# Patient Record
Sex: FEMALE | Race: WHITE | NOT HISPANIC OR LATINO
[De-identification: names, ages, dates, MRNs, and addresses within clinical notes are randomized per-mention and may not be internally consistent; named-entity substitution may affect disease eponyms.]

---

## 2022-11-02 ENCOUNTER — NON-APPOINTMENT (OUTPATIENT)
Age: 22
End: 2022-11-02

## 2023-01-16 ENCOUNTER — INPATIENT (INPATIENT)
Facility: HOSPITAL | Age: 23
LOS: 2 days | Discharge: HOME | DRG: 440 | End: 2023-01-19
Attending: INTERNAL MEDICINE | Admitting: FAMILY MEDICINE
Payer: COMMERCIAL

## 2023-01-16 VITALS — WEIGHT: 113.1 LBS | HEIGHT: 64 IN

## 2023-01-16 DIAGNOSIS — K85.90 ACUTE PANCREATITIS WITHOUT NECROSIS OR INFECTION, UNSPECIFIED: ICD-10-CM

## 2023-01-16 DIAGNOSIS — Z98.890 OTHER SPECIFIED POSTPROCEDURAL STATES: Chronic | ICD-10-CM

## 2023-01-16 LAB
ADD ON TEST-SPECIMEN IN LAB: SIGNIFICANT CHANGE UP
ALBUMIN SERPL ELPH-MCNC: 4.2 G/DL — SIGNIFICANT CHANGE UP (ref 3.3–5)
ALP SERPL-CCNC: 69 U/L — SIGNIFICANT CHANGE UP (ref 40–120)
ALT FLD-CCNC: 28 U/L — SIGNIFICANT CHANGE UP (ref 12–78)
ANION GAP SERPL CALC-SCNC: 11 MMOL/L — SIGNIFICANT CHANGE UP (ref 5–17)
APPEARANCE UR: CLEAR — SIGNIFICANT CHANGE UP
AST SERPL-CCNC: 21 U/L — SIGNIFICANT CHANGE UP (ref 15–37)
BASOPHILS # BLD AUTO: 0.05 K/UL — SIGNIFICANT CHANGE UP (ref 0–0.2)
BASOPHILS NFR BLD AUTO: 0.5 % — SIGNIFICANT CHANGE UP (ref 0–2)
BILIRUB SERPL-MCNC: 0.6 MG/DL — SIGNIFICANT CHANGE UP (ref 0.2–1.2)
BILIRUB UR-MCNC: NEGATIVE — SIGNIFICANT CHANGE UP
BUN SERPL-MCNC: 8 MG/DL — SIGNIFICANT CHANGE UP (ref 7–23)
CALCIUM SERPL-MCNC: 9.8 MG/DL — SIGNIFICANT CHANGE UP (ref 8.5–10.1)
CHLORIDE SERPL-SCNC: 99 MMOL/L — SIGNIFICANT CHANGE UP (ref 96–108)
CO2 SERPL-SCNC: 28 MMOL/L — SIGNIFICANT CHANGE UP (ref 22–31)
COLOR SPEC: YELLOW — SIGNIFICANT CHANGE UP
CREAT SERPL-MCNC: 0.9 MG/DL — SIGNIFICANT CHANGE UP (ref 0.5–1.3)
DIFF PNL FLD: NEGATIVE — SIGNIFICANT CHANGE UP
EGFR: 92 ML/MIN/1.73M2 — SIGNIFICANT CHANGE UP
EOSINOPHIL # BLD AUTO: 0.1 K/UL — SIGNIFICANT CHANGE UP (ref 0–0.5)
EOSINOPHIL NFR BLD AUTO: 1 % — SIGNIFICANT CHANGE UP (ref 0–6)
FLUAV AG NPH QL: SIGNIFICANT CHANGE UP
FLUBV AG NPH QL: SIGNIFICANT CHANGE UP
GLUCOSE SERPL-MCNC: 94 MG/DL — SIGNIFICANT CHANGE UP (ref 70–99)
GLUCOSE UR QL: NEGATIVE — SIGNIFICANT CHANGE UP
HCT VFR BLD CALC: 47.2 % — HIGH (ref 34.5–45)
HGB BLD-MCNC: 16.3 G/DL — HIGH (ref 11.5–15.5)
IMM GRANULOCYTES NFR BLD AUTO: 0.3 % — SIGNIFICANT CHANGE UP (ref 0–0.9)
KETONES UR-MCNC: NEGATIVE — SIGNIFICANT CHANGE UP
LEUKOCYTE ESTERASE UR-ACNC: NEGATIVE — SIGNIFICANT CHANGE UP
LIDOCAIN IGE QN: 2113 U/L — HIGH (ref 73–393)
LYMPHOCYTES # BLD AUTO: 2.99 K/UL — SIGNIFICANT CHANGE UP (ref 1–3.3)
LYMPHOCYTES # BLD AUTO: 30.4 % — SIGNIFICANT CHANGE UP (ref 13–44)
MCHC RBC-ENTMCNC: 29.4 PG — SIGNIFICANT CHANGE UP (ref 27–34)
MCHC RBC-ENTMCNC: 34.5 GM/DL — SIGNIFICANT CHANGE UP (ref 32–36)
MCV RBC AUTO: 85 FL — SIGNIFICANT CHANGE UP (ref 80–100)
MONOCYTES # BLD AUTO: 1.15 K/UL — HIGH (ref 0–0.9)
MONOCYTES NFR BLD AUTO: 11.7 % — SIGNIFICANT CHANGE UP (ref 2–14)
NEUTROPHILS # BLD AUTO: 5.5 K/UL — SIGNIFICANT CHANGE UP (ref 1.8–7.4)
NEUTROPHILS NFR BLD AUTO: 56.1 % — SIGNIFICANT CHANGE UP (ref 43–77)
NITRITE UR-MCNC: NEGATIVE — SIGNIFICANT CHANGE UP
PH UR: 7 — SIGNIFICANT CHANGE UP (ref 5–8)
PLATELET # BLD AUTO: 393 K/UL — SIGNIFICANT CHANGE UP (ref 150–400)
POTASSIUM SERPL-MCNC: 3.5 MMOL/L — SIGNIFICANT CHANGE UP (ref 3.5–5.3)
POTASSIUM SERPL-SCNC: 3.5 MMOL/L — SIGNIFICANT CHANGE UP (ref 3.5–5.3)
PROT SERPL-MCNC: 8.2 GM/DL — SIGNIFICANT CHANGE UP (ref 6–8.3)
PROT UR-MCNC: NEGATIVE — SIGNIFICANT CHANGE UP
RBC # BLD: 5.55 M/UL — HIGH (ref 3.8–5.2)
RBC # FLD: 14.8 % — HIGH (ref 10.3–14.5)
RSV RNA NPH QL NAA+NON-PROBE: SIGNIFICANT CHANGE UP
SARS-COV-2 RNA SPEC QL NAA+PROBE: SIGNIFICANT CHANGE UP
SODIUM SERPL-SCNC: 138 MMOL/L — SIGNIFICANT CHANGE UP (ref 135–145)
SP GR SPEC: 1 — LOW (ref 1.01–1.02)
TROPONIN I, HIGH SENSITIVITY RESULT: 3.33 NG/L — SIGNIFICANT CHANGE UP
UROBILINOGEN FLD QL: NEGATIVE — SIGNIFICANT CHANGE UP
WBC # BLD: 9.82 K/UL — SIGNIFICANT CHANGE UP (ref 3.8–10.5)
WBC # FLD AUTO: 9.82 K/UL — SIGNIFICANT CHANGE UP (ref 3.8–10.5)

## 2023-01-16 PROCEDURE — 76705 ECHO EXAM OF ABDOMEN: CPT | Mod: 26

## 2023-01-16 PROCEDURE — 99285 EMERGENCY DEPT VISIT HI MDM: CPT

## 2023-01-16 PROCEDURE — 93010 ELECTROCARDIOGRAM REPORT: CPT

## 2023-01-16 PROCEDURE — 85027 COMPLETE CBC AUTOMATED: CPT

## 2023-01-16 PROCEDURE — 80053 COMPREHEN METABOLIC PANEL: CPT

## 2023-01-16 PROCEDURE — 85025 COMPLETE CBC W/AUTO DIFF WBC: CPT

## 2023-01-16 PROCEDURE — 83735 ASSAY OF MAGNESIUM: CPT

## 2023-01-16 PROCEDURE — 36415 COLL VENOUS BLD VENIPUNCTURE: CPT

## 2023-01-16 PROCEDURE — C9113: CPT

## 2023-01-16 PROCEDURE — 99223 1ST HOSP IP/OBS HIGH 75: CPT

## 2023-01-16 PROCEDURE — 80048 BASIC METABOLIC PNL TOTAL CA: CPT

## 2023-01-16 PROCEDURE — 84100 ASSAY OF PHOSPHORUS: CPT

## 2023-01-16 PROCEDURE — 83690 ASSAY OF LIPASE: CPT

## 2023-01-16 RX ORDER — ONDANSETRON 8 MG/1
4 TABLET, FILM COATED ORAL EVERY 8 HOURS
Refills: 0 | Status: DISCONTINUED | OUTPATIENT
Start: 2023-01-16 | End: 2023-01-19

## 2023-01-16 RX ORDER — ONDANSETRON 8 MG/1
4 TABLET, FILM COATED ORAL ONCE
Refills: 0 | Status: COMPLETED | OUTPATIENT
Start: 2023-01-16 | End: 2023-01-16

## 2023-01-16 RX ORDER — ESCITALOPRAM OXALATE 10 MG/1
20 TABLET, FILM COATED ORAL AT BEDTIME
Refills: 0 | Status: DISCONTINUED | OUTPATIENT
Start: 2023-01-16 | End: 2023-01-19

## 2023-01-16 RX ORDER — PANTOPRAZOLE SODIUM 20 MG/1
40 TABLET, DELAYED RELEASE ORAL DAILY
Refills: 0 | Status: DISCONTINUED | OUTPATIENT
Start: 2023-01-16 | End: 2023-01-19

## 2023-01-16 RX ORDER — VALACYCLOVIR 500 MG/1
1 TABLET, FILM COATED ORAL
Qty: 0 | Refills: 0 | DISCHARGE

## 2023-01-16 RX ORDER — MORPHINE SULFATE 50 MG/1
2 CAPSULE, EXTENDED RELEASE ORAL EVERY 6 HOURS
Refills: 0 | Status: DISCONTINUED | OUTPATIENT
Start: 2023-01-16 | End: 2023-01-19

## 2023-01-16 RX ORDER — SODIUM CHLORIDE 9 MG/ML
1000 INJECTION INTRAMUSCULAR; INTRAVENOUS; SUBCUTANEOUS ONCE
Refills: 0 | Status: COMPLETED | OUTPATIENT
Start: 2023-01-16 | End: 2023-01-16

## 2023-01-16 RX ORDER — SODIUM CHLORIDE 9 MG/ML
1000 INJECTION, SOLUTION INTRAVENOUS
Refills: 0 | Status: DISCONTINUED | OUTPATIENT
Start: 2023-01-16 | End: 2023-01-19

## 2023-01-16 RX ORDER — TRAMADOL HYDROCHLORIDE 50 MG/1
25 TABLET ORAL EVERY 8 HOURS
Refills: 0 | Status: DISCONTINUED | OUTPATIENT
Start: 2023-01-16 | End: 2023-01-19

## 2023-01-16 RX ORDER — LANOLIN ALCOHOL/MO/W.PET/CERES
3 CREAM (GRAM) TOPICAL AT BEDTIME
Refills: 0 | Status: DISCONTINUED | OUTPATIENT
Start: 2023-01-16 | End: 2023-01-19

## 2023-01-16 RX ORDER — ACETAMINOPHEN 500 MG
650 TABLET ORAL EVERY 6 HOURS
Refills: 0 | Status: DISCONTINUED | OUTPATIENT
Start: 2023-01-16 | End: 2023-01-19

## 2023-01-16 RX ORDER — SODIUM CHLORIDE 9 MG/ML
1000 INJECTION, SOLUTION INTRAVENOUS ONCE
Refills: 0 | Status: COMPLETED | OUTPATIENT
Start: 2023-01-16 | End: 2023-01-16

## 2023-01-16 RX ORDER — ALBUTEROL 90 UG/1
2 AEROSOL, METERED ORAL EVERY 6 HOURS
Refills: 0 | Status: DISCONTINUED | OUTPATIENT
Start: 2023-01-16 | End: 2023-01-19

## 2023-01-16 RX ADMIN — SODIUM CHLORIDE 1000 MILLILITER(S): 9 INJECTION, SOLUTION INTRAVENOUS at 18:31

## 2023-01-16 RX ADMIN — SODIUM CHLORIDE 1000 MILLILITER(S): 9 INJECTION INTRAMUSCULAR; INTRAVENOUS; SUBCUTANEOUS at 15:45

## 2023-01-16 RX ADMIN — ESCITALOPRAM OXALATE 20 MILLIGRAM(S): 10 TABLET, FILM COATED ORAL at 21:45

## 2023-01-16 RX ADMIN — SODIUM CHLORIDE 150 MILLILITER(S): 9 INJECTION, SOLUTION INTRAVENOUS at 21:20

## 2023-01-16 RX ADMIN — ONDANSETRON 4 MILLIGRAM(S): 8 TABLET, FILM COATED ORAL at 14:53

## 2023-01-16 RX ADMIN — Medication 650 MILLIGRAM(S): at 21:14

## 2023-01-16 RX ADMIN — SODIUM CHLORIDE 2000 MILLILITER(S): 9 INJECTION INTRAMUSCULAR; INTRAVENOUS; SUBCUTANEOUS at 14:54

## 2023-01-16 RX ADMIN — Medication 650 MILLIGRAM(S): at 22:11

## 2023-01-16 NOTE — PATIENT PROFILE ADULT - FALL HARM RISK - UNIVERSAL INTERVENTIONS
Bed in lowest position, wheels locked, appropriate side rails in place/Call bell, personal items and telephone in reach/Instruct patient to call for assistance before getting out of bed or chair/Non-slip footwear when patient is out of bed/[place] to call system/Physically safe environment - no spills, clutter or unnecessary equipment/Purposeful Proactive Rounding/Room/bathroom lighting operational, light cord in reach

## 2023-01-16 NOTE — ED STATDOCS - PROGRESS NOTE DETAILS
Pt. is a 22 year old female presenting with cough, fever, vomiting, nausea and body aches x 4 days.  Pt. went out drinking on 1/11 and symptoms started the next day.  Pt. tried drinking Gatorade and soup however was unable to keep it down.  Pt. also with palpitations x 2 days.  COVID and pregnancy test negative at home.  Lucretia Hale PA-C LIpase 2113.  Pt. last vomited 3 am.  Will give fluids, US, reassess.  Lucretia Hale PA-C Lipase 2113.  PT. still with nausea. Will keep NPO and admit for pancreatitis.  Lucretia Hale PA-C

## 2023-01-16 NOTE — ED STATDOCS - OBJECTIVE STATEMENT
23 y/o female with no pertinent PMHx presents to the ED c/o sweating, cough, fever, vomiting, nausea and body aches since 1/12. States 1/11 she went out drinking with friends and woke up the next morning with symptoms. Pt has been drinking Gatorade and soup, but is unable to keep anything down. Pt also endorses some palpitations for the last 2 days. Pt states she took a COVID test and pregnancy test at home that were both negative.

## 2023-01-16 NOTE — H&P ADULT - NSICDXFAMILYHX_GEN_ALL_CORE_FT
FAMILY HISTORY:  Family history of cervical cancer  FH: breast cancer  FH: diabetes mellitus  FH: HTN (hypertension)  FH: hyperlipidemia

## 2023-01-16 NOTE — ED STATDOCS - NS ED ROS FT
Constitutional: nad, well appearing, +fever  HEENT:  no nasal congestion, eye drainage or ear pain.    CVS:  no chest pain, +palpitations  Resp:  No sob, +cough  GI:  +nausea, +vomiting  :  no dysuria  MSK: no joint pain or limited ROM  Skin: no rash  Neuro: no change in mental status or level of consciousness  Heme/lymph: no bleeding

## 2023-01-16 NOTE — H&P ADULT - NSHPREVIEWOFSYSTEMS_GEN_ALL_CORE
Gen: + subjective fevers, weight loss, malaise, fatigue  Eyes: no blurred vision or lacrimation  ENT: no tinnitus, vertigo, or decreased hearing  Resp: + dyspnea. No wheezing, pleuritic chest pain, or hemoptysis  CV: + palpitations. No chest pain  GI: + nausea, vomiting, abdominal pain. No diarrhea, melena, or hematochezia  : + increased frequency. No dysuria, hematuria, or incontinence  MSK: + myalgias. No joint swelling  Neuro: + dizziness. No focal deficits, confusion, tremors, or seizures  Skin: no rash, lesions, or edema

## 2023-01-16 NOTE — H&P ADULT - NSHPLABSRESULTS_GEN_ALL_CORE
A (Catheter Nc Trek 3mm 15mm Rx Radopq Smth Rnd Tip) balloon was inflated in the circumflex artery and was removed in tact.     The balloon was inflated at 14 emiliano for 16 seconds at 10/19/2022 6:45 PM.  The balloon was used for 2nd inflation at 16 emiliano for 14 seconds.   Labs personally reviewed and interpreted. Notable for no leukocytosis (WBC 9.82), left shift, or lymphopenia. Hb 16.3, plt 393, electrolytes wnl, HCO3 28, BUN/creatinine 8/0.90, BG 94, LFTs wnl, and lipase elevated at 2113.  UA showed SG 1.005 but was otherwise unremarkable.  RSV/Flu A/Flu B/COVID-19 PCR negative.    No CXR performed.  RUQ US personally reviewed and interpreted. Notable for normal appearing pancreas, liver, and bile ducts. CBD 2 mm.    EKG not performed. Labs personally reviewed and interpreted. Notable for no leukocytosis (WBC 9.82), left shift, or lymphopenia. Hb 16.3, plt 393, electrolytes wnl, HCO3 28, BUN/creatinine 8/0.90, BG 94, LFTs wnl, and lipase elevated at 2113.  UA showed SG 1.005 but was otherwise unremarkable.  RSV/Flu A/Flu B/COVID-19 PCR negative.    No CXR performed.  RUQ US personally reviewed and interpreted. Notable for normal appearing pancreas, liver, and bile ducts. CBD 2 mm.    EKG personally reviewed. Sinus tachycardia, normal axis. Q waves and TWIs in the inferior leads with further TWIs in V1-3 and biphasic T waves in V4, P wave inversions in V1-2, but no ST depressions or elevations. No prior EKG for comparison. Rate 103, , QTc 448.

## 2023-01-16 NOTE — H&P ADULT - HISTORY OF PRESENT ILLNESS
21 yo F with no pertinent PMH who presents with aches, nausea, and vomiting after binge drinking.    In the ED, she was given Zofran 4 mg IV x1, LR 1L x1, and NS 1L x1. 23 yo F with a PMH of anxiety and childhood asthma who presents with aches, N/V, and upper abdominal pain after binge drinking. She had 4 Michelob Ultras on Wednesday 1/11, and the following day she began profusely vomiting. She has since had constant NBNB emesis and has tried to take in Gatorade and soups without avail. She has been urinating a lot. She had subjective fevers, body aches, and diffuse upper abdominal pain. The pain is achy, coming and going, and sometimes radiating down her abdomen, but moderate in intensity. She has not had a BM in about a week. She feels like she has drunk more without any problems in the past. She denies chest pain but endorses palpitations. She also feels dyspneic and has some dizziness, along with malaise.    She does smoke marijuana and vapes sometimes, although she has not done it in the past week.  She does have a history of vomiting in the past and had an EGD in the past which she says showed a gastric polyp but no other abnormalities.    In the ED, she was given Zofran 4 mg IV x1, LR 1L x1, and NS 1L x1.

## 2023-01-16 NOTE — ED ADULT TRIAGE NOTE - CHIEF COMPLAINT QUOTE
Pt presented to the ER with c/o vomiting. Pt stated that she went out with her friends on Wed. and on Thursday she started vomiting. Pt reported unable to tolerate PO intake. Pt also c/o body aches.

## 2023-01-16 NOTE — PHARMACOTHERAPY INTERVENTION NOTE - COMMENTS
Medication reconciliation completed.  Reviewed Medication list and confirmed med allergies with patient; confirmed with Dr. First Medsacha.

## 2023-01-16 NOTE — H&P ADULT - ASSESSMENT
23 yo F with no pertinent PMH who presents with aches, nausea, and vomiting after binge drinking, found to have pancreatitis.    #Pancreatitis  -     #Prophylactic Measure  - DVT PPX:   - Diet: NPO  - Dispo:    23 yo F with a PMH of anxiety and childhood asthma who presents with aches, N/V, and upper abdominal pain after binge drinking, likely from pancreatitis.    #Pancreatitis  - ______________    #Alcohol Abuse  - ______________    #Anxiety  - C/w Escitalopram 20 mg QD    #Childhood Asthma  - Albuterol PRN for SOB    #Prophylactic Measure  - DVT PPX: IMPROVE score of 0, no PPX needed  - Diet: NPO  - Dispo: pending improvement in sxs 21 yo F with a PMH of anxiety and childhood asthma who presents with aches, N/V, and upper abdominal pain after binge drinking, likely from pancreatitis.    #Acute Alcoholic Pancreatitis / Intractable Nausea and Vomiting  - Pancreatitis likely from alcohol use  - Lipase 2113, and patient have slightly atypical pain (epigastric but radiating downward instead of towards back)  - Patient does have a history of marijuana use and vaping but has not used in past few days  - Given clinical history, would hold off on CT and treat as pancreatitis  - Would also give PPI for now  - Zofran PRN for N/V  - NPO, advance diet as tolerated  - Pain control PRN  - IVFs    #Alcohol Consumption Binge Drinking / Marijuana Use  - Patient not a daily drinker but discussed with patient need to cut back on binge drinking  - Also discussed recommendation to cut back on marijuana use given patient's vomiting history    #Anxiety  - C/w Escitalopram 20 mg QD    #(Childhood) Asthma  - Albuterol PRN for SOB    #Prophylactic Measure  - DVT PPX: IMPROVE score of 0, no PPX needed  - Diet: NPO  - Dispo: pending improvement in sxs

## 2023-01-16 NOTE — ED ADULT NURSE NOTE - CAS TRG GEN SKIN CONDITION
Detail Level: Detailed
Depth Of Biopsy: dermis
Was A Bandage Applied: Yes
Size Of Lesion In Cm: 0.2
X Size Of Lesion In Cm: 0
Biopsy Type: H and E
Biopsy Method: Personna blade
Anesthesia Type: 1% lidocaine with epinephrine
Anesthesia Volume In Cc (Will Not Render If 0): 0.5
Hemostasis: Drysol
Wound Care: Polysporin ointment
Dressing: bandage
Destruction After The Procedure: No
Type Of Destruction Used: Curettage
Curettage Text: The wound bed was treated with curettage after the biopsy was performed.
Cryotherapy Text: The wound bed was treated with cryotherapy after the biopsy was performed.
Electrodesiccation Text: The wound bed was treated with electrodesiccation after the biopsy was performed.
Electrodesiccation And Curettage Text: The wound bed was treated with electrodesiccation and curettage after the biopsy was performed.
Silver Nitrate Text: The wound bed was treated with silver nitrate after the biopsy was performed.
Lab: [id number]
Lab Facility: [id number]
Consent: Verbal consent was obtained and risks were reviewed including but not limited to scarring, infection, bleeding, scabbing, incomplete removal, nerve damage and allergy to anesthesia.
Post-Care Instructions: I reviewed with the patient in detail post-care instructions. Patient is to keep the biopsy site dry overnight, and then apply Vaseline daily until healed.
Notification Instructions: Patient will be notified of biopsy results. However, patient instructed to call the office if not contacted within 2 weeks.
Warm/Dry
Billing Type: Third-Party Bill
Information: Selecting Yes will display possible errors in your note based on the variables you have selected. This validation is only offered as a suggestion for you. PLEASE NOTE THAT THE VALIDATION TEXT WILL BE REMOVED WHEN YOU FINALIZE YOUR NOTE. IF YOU WANT TO FAX A PRELIMINARY NOTE YOU WILL NEED TO TOGGLE THIS TO 'NO' IF YOU DO NOT WANT IT IN YOUR FAXED NOTE.

## 2023-01-16 NOTE — H&P ADULT - NSHPPHYSICALEXAM_GEN_ALL_CORE
Vital Signs Last 24 Hrs  T(C): 36.8 (16 Jan 2023 13:57), Max: 36.8 (16 Jan 2023 13:57)  T(F): 98.2 (16 Jan 2023 13:57), Max: 98.2 (16 Jan 2023 13:57)  HR: 97 (16 Jan 2023 13:57) (97 - 97)  BP: 128/80 (16 Jan 2023 13:57) (128/80 - 128/80)  BP(mean): 95 (16 Jan 2023 13:57) (95 - 95)  RR: 18 (16 Jan 2023 13:57) (18 - 18)  SpO2: 99% (16 Jan 2023 13:57) (99% - 99%) Vital Signs Last 24 Hrs  T(C): 36.8 (16 Jan 2023 13:57), Max: 36.8 (16 Jan 2023 13:57)  T(F): 98.2 (16 Jan 2023 13:57), Max: 98.2 (16 Jan 2023 13:57)  HR: 97 (16 Jan 2023 13:57) (97 - 97)  BP: 128/80 (16 Jan 2023 13:57) (128/80 - 128/80)  BP(mean): 95 (16 Jan 2023 13:57) (95 - 95)  RR: 18 (16 Jan 2023 13:57) (18 - 18)  SpO2: 99% (16 Jan 2023 13:57) (99% - 99%)    GENERAL: No acute distress  HEENT: PERRL, EOMI, MMM, no oropharyngeal lesions  NECK: supple, no stiffness, no JVD, no thyromegaly  PULM: respirations non-labored, clear to auscultation bilaterally, no rales, rhonchi, or wheezes  CV: regular rate and rhythm, no murmurs, gallops, or rubs  GI: abdomen soft, nontender, nondistended, no masses felt, normal bowel sounds  : no genital lesions or ulcers  MSK: strength 5/5 bilateral upper/lower extremities. No joint swelling, erythema, or warmth.  LYMPH: no anterior cervical, posterior cervical, supraclavicular, or inguinal lymphadenopathy  NEURO: A&Ox3, no tremors, sensation intact  SKIN: no rashes, lesions, or edema Vital Signs Last 24 Hrs  T(C): 36.8 (16 Jan 2023 13:57), Max: 36.8 (16 Jan 2023 13:57)  T(F): 98.2 (16 Jan 2023 13:57), Max: 98.2 (16 Jan 2023 13:57)  HR: 97 (16 Jan 2023 13:57) (97 - 97)  BP: 128/80 (16 Jan 2023 13:57) (128/80 - 128/80)  BP(mean): 95 (16 Jan 2023 13:57) (95 - 95)  RR: 18 (16 Jan 2023 13:57) (18 - 18)  SpO2: 99% (16 Jan 2023 13:57) (99% - 99%)    GENERAL: No acute distress  HEENT: PERRL, EOMI, MM very dry, no oropharyngeal lesions  NECK: supple, no stiffness, no JVD, no thyromegaly  PULM: respirations non-labored, clear to auscultation bilaterally, no rales, rhonchi, or wheezes  CV: regular rate and rhythm, no murmurs, gallops, or rubs  GI: abdomen soft, + diffuse upper abdominal TTP, nondistended, no masses felt, normal bowel sounds  MSK: strength 5/5 bilateral upper/lower extremities. No joint swelling, erythema, or warmth.  LYMPH: no anterior cervical, posterior cervical, supraclavicular, or inguinal lymphadenopathy  NEURO: A&Ox3, no tremors, sensation intact  SKIN: no rashes, lesions, or edema

## 2023-01-16 NOTE — ED STATDOCS - CLINICAL SUMMARY MEDICAL DECISION MAKING FREE TEXT BOX
Pt here with nausea, vomiting and body aches. Will evaluate for UTI vs pregnancy vs AGE. Will provide meds, fluids and reassess. Pt here with nausea, vomiting and body aches. Will evaluate for UTI vs pregnancy vs AGE. Will provide meds, fluids and reassess.        Lipase 2113.  PT. still with nausea. Will keep NPO and admit for pancreatitis.  Lucretia Hale PA-C

## 2023-01-17 LAB
ADD ON TEST-SPECIMEN IN LAB: SIGNIFICANT CHANGE UP
ANION GAP SERPL CALC-SCNC: 5 MMOL/L — SIGNIFICANT CHANGE UP (ref 5–17)
BUN SERPL-MCNC: 7 MG/DL — SIGNIFICANT CHANGE UP (ref 7–23)
CALCIUM SERPL-MCNC: 8.4 MG/DL — LOW (ref 8.5–10.1)
CHLORIDE SERPL-SCNC: 107 MMOL/L — SIGNIFICANT CHANGE UP (ref 96–108)
CO2 SERPL-SCNC: 28 MMOL/L — SIGNIFICANT CHANGE UP (ref 22–31)
CREAT SERPL-MCNC: 0.6 MG/DL — SIGNIFICANT CHANGE UP (ref 0.5–1.3)
CULTURE RESULTS: NO GROWTH — SIGNIFICANT CHANGE UP
EGFR: 129 ML/MIN/1.73M2 — SIGNIFICANT CHANGE UP
GLUCOSE SERPL-MCNC: 92 MG/DL — SIGNIFICANT CHANGE UP (ref 70–99)
HCT VFR BLD CALC: 37.3 % — SIGNIFICANT CHANGE UP (ref 34.5–45)
HGB BLD-MCNC: 12.6 G/DL — SIGNIFICANT CHANGE UP (ref 11.5–15.5)
LIDOCAIN IGE QN: 2901 U/L — HIGH (ref 73–393)
MAGNESIUM SERPL-MCNC: 1.9 MG/DL — SIGNIFICANT CHANGE UP (ref 1.6–2.6)
MCHC RBC-ENTMCNC: 29.2 PG — SIGNIFICANT CHANGE UP (ref 27–34)
MCHC RBC-ENTMCNC: 33.8 GM/DL — SIGNIFICANT CHANGE UP (ref 32–36)
MCV RBC AUTO: 86.5 FL — SIGNIFICANT CHANGE UP (ref 80–100)
PHOSPHATE SERPL-MCNC: 3.5 MG/DL — SIGNIFICANT CHANGE UP (ref 2.5–4.5)
PLATELET # BLD AUTO: 287 K/UL — SIGNIFICANT CHANGE UP (ref 150–400)
POTASSIUM SERPL-MCNC: 4.1 MMOL/L — SIGNIFICANT CHANGE UP (ref 3.5–5.3)
POTASSIUM SERPL-SCNC: 4.1 MMOL/L — SIGNIFICANT CHANGE UP (ref 3.5–5.3)
RBC # BLD: 4.31 M/UL — SIGNIFICANT CHANGE UP (ref 3.8–5.2)
RBC # FLD: 14.7 % — HIGH (ref 10.3–14.5)
SODIUM SERPL-SCNC: 140 MMOL/L — SIGNIFICANT CHANGE UP (ref 135–145)
SPECIMEN SOURCE: SIGNIFICANT CHANGE UP
WBC # BLD: 7.06 K/UL — SIGNIFICANT CHANGE UP (ref 3.8–10.5)
WBC # FLD AUTO: 7.06 K/UL — SIGNIFICANT CHANGE UP (ref 3.8–10.5)

## 2023-01-17 PROCEDURE — 99232 SBSQ HOSP IP/OBS MODERATE 35: CPT

## 2023-01-17 RX ORDER — BENZOCAINE AND MENTHOL 5; 1 G/100ML; G/100ML
1 LIQUID ORAL
Refills: 0 | Status: DISCONTINUED | OUTPATIENT
Start: 2023-01-17 | End: 2023-01-19

## 2023-01-17 RX ORDER — SODIUM CHLORIDE 9 MG/ML
1000 INJECTION INTRAMUSCULAR; INTRAVENOUS; SUBCUTANEOUS
Refills: 0 | Status: DISCONTINUED | OUTPATIENT
Start: 2023-01-17 | End: 2023-01-19

## 2023-01-17 RX ADMIN — SODIUM CHLORIDE 150 MILLILITER(S): 9 INJECTION, SOLUTION INTRAVENOUS at 03:03

## 2023-01-17 RX ADMIN — BENZOCAINE AND MENTHOL 1 LOZENGE: 5; 1 LIQUID ORAL at 15:49

## 2023-01-17 RX ADMIN — SODIUM CHLORIDE 125 MILLILITER(S): 9 INJECTION INTRAMUSCULAR; INTRAVENOUS; SUBCUTANEOUS at 20:34

## 2023-01-17 RX ADMIN — PANTOPRAZOLE SODIUM 40 MILLIGRAM(S): 20 TABLET, DELAYED RELEASE ORAL at 09:38

## 2023-01-17 RX ADMIN — ESCITALOPRAM OXALATE 20 MILLIGRAM(S): 10 TABLET, FILM COATED ORAL at 21:12

## 2023-01-17 RX ADMIN — SODIUM CHLORIDE 125 MILLILITER(S): 9 INJECTION INTRAMUSCULAR; INTRAVENOUS; SUBCUTANEOUS at 09:36

## 2023-01-17 NOTE — PROGRESS NOTE ADULT - SUBJECTIVE AND OBJECTIVE BOX
C/c: epigastric pain.     HPI:  22F with a PMH of anxiety and childhood asthma who presents with aches, N/V, and upper abdominal pain after binge drinking. She had 4 Michelob Ultras on , and the following day she began profusely vomiting. She has since had constant NBNB emesis and has tried to take in Gatorade and soups without avail. She has been urinating a lot. She had subjective fevers, body aches, and diffuse upper abdominal pain. The pain is achy, coming and going, and sometimes radiating down her abdomen, but moderate in intensity. She has not had a BM in about a week. She feels like she has drunk more without any problems in the past. She denies chest pain but endorses palpitations. She also feels dyspneic and has some dizziness, along with malaise.    She does smoke marijuana and vapes sometimes, although she has not done it in the past week.  She does have a history of vomiting in the past and had an EGD in the past which she says showed a gastric polyp but no other abnormalities.    In the ED, she was given Zofran 4 mg IV x1, LR 1L x1, and NS 1L x1.     she is admitted with Acute pancreatitis.     pt seen and examined this am. Had some nausea. no vomiting. Some epigastric discomfort. no f/c/r.   Wants to try liquids.       Review of system- All 10 systems reviewed and is as per HPI otherwise negative.       Vital Signs Last 24 Hrs  T(C): 36.3 (2023 08:46), Max: 36.9 (2023 20:57)  T(F): 97.3 (2023 08:46), Max: 98.4 (2023 20:57)  HR: 50 (2023 08:46) (50 - 67)  BP: 102/59 (2023 08:46) (102/59 - 115/71)  RR: 18 (2023 08:46) (18 - 18)  SpO2: 100% (2023 08:46) (100% - 100%)    Parameters below as of 2023 08:46  Patient On (Oxygen Delivery Method): room air      PHYSICAL EXAM:    GENERAL: Comfortable, no acute distress  HEAD:  Atraumatic, Normocephalic  EYES: EOMI, PERRLA  HEENT: Moist mucous membranes  NECK: Supple, No JVD  NERVOUS SYSTEM:  Alert & Oriented X3, Motor Strength 5/5 B/L upper and lower extremities  CHEST/LUNG: Clear to auscultation bilaterally  HEART: Regular rate and rhythm; No murmurs, rubs, or gallops  ABDOMEN: Soft,  mild epigastric tenderness , Nondistended; Bowel sounds present  GENITOURINARY- Voiding, no palpable bladder  EXTREMITIES:  No clubbing, cyanosis, or edema  MUSCULOSKELETAL- No muscle tenderness, No joint tenderness  SKIN-no rash        LABS:                        12.6   7.06  )-----------( 287      ( 2023 08:34 )             37.3         140  |  107  |  7   ----------------------------<  92  4.1   |  28  |  0.60    Ca    8.4<L>      2023 08:34  Phos  3.5       Mg     1.9         TPro  8.2  /  Alb  4.2  /  TBili  0.6  /  DBili  x   /  AST  21  /  ALT  28  /  AlkPhos  69        Urinalysis Basic - ( 2023 14:47 )    Color: Yellow / Appearance: Clear / S.005 / pH: x  Gluc: x / Ketone: Negative  / Bili: Negative / Urobili: Negative   Blood: x / Protein: Negative / Nitrite: Negative   Leuk Esterase: Negative / RBC: x / WBC x   Sq Epi: x / Non Sq Epi: x / Bacteria: x      MEDS  acetaminophen     Tablet .. 650 milliGRAM(s) Oral every 6 hours PRN  albuterol    90 MICROgram(s) HFA Inhaler 2 Puff(s) Inhalation every 6 hours PRN  aluminum hydroxide/magnesium hydroxide/simethicone Suspension 30 milliLiter(s) Oral every 4 hours PRN  benzocaine 15 mG/menthol 3.6 mG Lozenge 1 Lozenge Oral every 2 hours PRN  escitalopram 20 milliGRAM(s) Oral at bedtime  lactated ringers. 1000 milliLiter(s) IV Continuous <Continuous>  melatonin 3 milliGRAM(s) Oral at bedtime PRN  morphine  - Injectable 2 milliGRAM(s) IV Push every 6 hours PRN  ondansetron Injectable 4 milliGRAM(s) IV Push every 8 hours PRN  pantoprazole  Injectable 40 milliGRAM(s) IV Push daily  sodium chloride 0.9%. 1000 milliLiter(s) IV Continuous <Continuous>  traMADol 25 milliGRAM(s) Oral every 8 hours PRN    ASSESSMENT AND PLAN:  22f, PMH as above a/w    1. Acute pancreatitis:  -lipase increased from yesterday, but clinically better.   -trial of clears  -ivf  -ppi  -if lipase continue to increase, will need further imaging.     2. Anxiety:  -escitalopram.     3. Asthma  -prn albuterol.     4. DVT px  -ambulating.

## 2023-01-18 LAB
ALBUMIN SERPL ELPH-MCNC: 3.4 G/DL — SIGNIFICANT CHANGE UP (ref 3.3–5)
ALP SERPL-CCNC: 53 U/L — SIGNIFICANT CHANGE UP (ref 40–120)
ALT FLD-CCNC: 19 U/L — SIGNIFICANT CHANGE UP (ref 12–78)
ANION GAP SERPL CALC-SCNC: 6 MMOL/L — SIGNIFICANT CHANGE UP (ref 5–17)
AST SERPL-CCNC: 16 U/L — SIGNIFICANT CHANGE UP (ref 15–37)
BASOPHILS # BLD AUTO: 0.04 K/UL — SIGNIFICANT CHANGE UP (ref 0–0.2)
BASOPHILS NFR BLD AUTO: 0.5 % — SIGNIFICANT CHANGE UP (ref 0–2)
BILIRUB SERPL-MCNC: 0.4 MG/DL — SIGNIFICANT CHANGE UP (ref 0.2–1.2)
BUN SERPL-MCNC: 4 MG/DL — LOW (ref 7–23)
CALCIUM SERPL-MCNC: 8.7 MG/DL — SIGNIFICANT CHANGE UP (ref 8.5–10.1)
CHLORIDE SERPL-SCNC: 108 MMOL/L — SIGNIFICANT CHANGE UP (ref 96–108)
CO2 SERPL-SCNC: 26 MMOL/L — SIGNIFICANT CHANGE UP (ref 22–31)
CREAT SERPL-MCNC: 0.64 MG/DL — SIGNIFICANT CHANGE UP (ref 0.5–1.3)
EGFR: 127 ML/MIN/1.73M2 — SIGNIFICANT CHANGE UP
EOSINOPHIL # BLD AUTO: 0.37 K/UL — SIGNIFICANT CHANGE UP (ref 0–0.5)
EOSINOPHIL NFR BLD AUTO: 5 % — SIGNIFICANT CHANGE UP (ref 0–6)
GLUCOSE SERPL-MCNC: 90 MG/DL — SIGNIFICANT CHANGE UP (ref 70–99)
HCT VFR BLD CALC: 38.8 % — SIGNIFICANT CHANGE UP (ref 34.5–45)
HGB BLD-MCNC: 12.8 G/DL — SIGNIFICANT CHANGE UP (ref 11.5–15.5)
IMM GRANULOCYTES NFR BLD AUTO: 0.3 % — SIGNIFICANT CHANGE UP (ref 0–0.9)
LIDOCAIN IGE QN: 2545 U/L — HIGH (ref 73–393)
LYMPHOCYTES # BLD AUTO: 2.46 K/UL — SIGNIFICANT CHANGE UP (ref 1–3.3)
LYMPHOCYTES # BLD AUTO: 33.2 % — SIGNIFICANT CHANGE UP (ref 13–44)
MAGNESIUM SERPL-MCNC: 2.1 MG/DL — SIGNIFICANT CHANGE UP (ref 1.6–2.6)
MCHC RBC-ENTMCNC: 29 PG — SIGNIFICANT CHANGE UP (ref 27–34)
MCHC RBC-ENTMCNC: 33 GM/DL — SIGNIFICANT CHANGE UP (ref 32–36)
MCV RBC AUTO: 87.8 FL — SIGNIFICANT CHANGE UP (ref 80–100)
MONOCYTES # BLD AUTO: 0.72 K/UL — SIGNIFICANT CHANGE UP (ref 0–0.9)
MONOCYTES NFR BLD AUTO: 9.7 % — SIGNIFICANT CHANGE UP (ref 2–14)
NEUTROPHILS # BLD AUTO: 3.8 K/UL — SIGNIFICANT CHANGE UP (ref 1.8–7.4)
NEUTROPHILS NFR BLD AUTO: 51.3 % — SIGNIFICANT CHANGE UP (ref 43–77)
PHOSPHATE SERPL-MCNC: 3.1 MG/DL — SIGNIFICANT CHANGE UP (ref 2.5–4.5)
PLATELET # BLD AUTO: 295 K/UL — SIGNIFICANT CHANGE UP (ref 150–400)
POTASSIUM SERPL-MCNC: 3.8 MMOL/L — SIGNIFICANT CHANGE UP (ref 3.5–5.3)
POTASSIUM SERPL-SCNC: 3.8 MMOL/L — SIGNIFICANT CHANGE UP (ref 3.5–5.3)
PROT SERPL-MCNC: 6.7 GM/DL — SIGNIFICANT CHANGE UP (ref 6–8.3)
RBC # BLD: 4.42 M/UL — SIGNIFICANT CHANGE UP (ref 3.8–5.2)
RBC # FLD: 14.8 % — HIGH (ref 10.3–14.5)
SODIUM SERPL-SCNC: 140 MMOL/L — SIGNIFICANT CHANGE UP (ref 135–145)
WBC # BLD: 7.41 K/UL — SIGNIFICANT CHANGE UP (ref 3.8–10.5)
WBC # FLD AUTO: 7.41 K/UL — SIGNIFICANT CHANGE UP (ref 3.8–10.5)

## 2023-01-18 PROCEDURE — 99232 SBSQ HOSP IP/OBS MODERATE 35: CPT

## 2023-01-18 RX ADMIN — PANTOPRAZOLE SODIUM 40 MILLIGRAM(S): 20 TABLET, DELAYED RELEASE ORAL at 09:58

## 2023-01-18 RX ADMIN — SODIUM CHLORIDE 125 MILLILITER(S): 9 INJECTION INTRAMUSCULAR; INTRAVENOUS; SUBCUTANEOUS at 02:18

## 2023-01-18 RX ADMIN — SODIUM CHLORIDE 125 MILLILITER(S): 9 INJECTION INTRAMUSCULAR; INTRAVENOUS; SUBCUTANEOUS at 13:01

## 2023-01-18 RX ADMIN — SODIUM CHLORIDE 125 MILLILITER(S): 9 INJECTION INTRAMUSCULAR; INTRAVENOUS; SUBCUTANEOUS at 21:56

## 2023-01-18 RX ADMIN — ESCITALOPRAM OXALATE 20 MILLIGRAM(S): 10 TABLET, FILM COATED ORAL at 21:56

## 2023-01-18 NOTE — PROGRESS NOTE ADULT - SUBJECTIVE AND OBJECTIVE BOX
C/c: epigastric pain.     HPI:  22F with a PMH of anxiety and childhood asthma who presents with aches, N/V, and upper abdominal pain after binge drinking. She had 4 Michelob Ultras on , and the following day she began profusely vomiting. She has since had constant NBNB emesis and has tried to take in Gatorade and soups without avail. She has been urinating a lot. She had subjective fevers, body aches, and diffuse upper abdominal pain. The pain is achy, coming and going, and sometimes radiating down her abdomen, but moderate in intensity. She has not had a BM in about a week. She feels like she has drunk more without any problems in the past. She denies chest pain but endorses palpitations. She also feels dyspneic and has some dizziness, along with malaise.    She does smoke marijuana and vapes sometimes, although she has not done it in the past week.  She does have a history of vomiting in the past and had an EGD in the past which she says showed a gastric polyp but no other abnormalities.    In the ED, she was given Zofran 4 mg IV x1, LR 1L x1, and NS 1L x1.     she is admitted with Acute pancreatitis.     pt seen and examined this am. Tolerating solid food but still with some epigastric discomfort. no n/v. Ambulating. no difficulty voiding.    Review of system- All 10 systems reviewed and is as per HPI otherwise negative.     Vital Signs Last 24 Hrs  T(C): 36.6 (2023 09:02), Max: 37.2 (2023 00:40)  T(F): 97.8 (2023 09:02), Max: 98.9 (2023 00:40)  HR: 62 (2023 09:02) (62 - 80)  BP: 114/56 (2023 09:02) (106/64 - 114/56)  BP(mean): 66 (2023 20:48) (66 - 66)  RR: 18 (2023 09:02) (18 - 18)  SpO2: 100% (2023 09:02) (100% - 100%)    Parameters below as of 2023 09:02  Patient On (Oxygen Delivery Method): room air      PHYSICAL EXAM:    GENERAL: Comfortable, no acute distress  HEAD:  Atraumatic, Normocephalic  EYES: EOMI, PERRLA  HEENT: Moist mucous membranes  NECK: Supple, No JVD  NERVOUS SYSTEM:  Alert & Oriented X3, Motor Strength 5/5 B/L upper and lower extremities  CHEST/LUNG: Clear to auscultation bilaterally  HEART: Regular rate and rhythm; No murmurs, rubs, or gallops  ABDOMEN: Soft,  mild epigastric tenderness , Nondistended; Bowel sounds present  GENITOURINARY- Voiding, no palpable bladder  EXTREMITIES:  No clubbing, cyanosis, or edema  MUSCULOSKELETAL- No muscle tenderness, No joint tenderness  SKIN-no rash        LABS:                        12.8   7.41  )-----------( 295      ( 2023 08:44 )             38.8     -    140  |  108  |  4<L>  ----------------------------<  90  3.8   |  26  |  0.64    Ca    8.7      2023 08:44  Phos  3.1     -  Mg     2.1         TPro  6.7  /  Alb  3.4  /  TBili  0.4  /  DBili  x   /  AST  16  /  ALT  19  /  AlkPhos  53        Urinalysis Basic - ( 2023 14:47 )    Color: Yellow / Appearance: Clear / S.005 / pH: x  Gluc: x / Ketone: Negative  / Bili: Negative / Urobili: Negative   Blood: x / Protein: Negative / Nitrite: Negative   Leuk Esterase: Negative / RBC: x / WBC x   Sq Epi: x / Non Sq Epi: x / Bacteria: x  MEDICATIONS  (STANDING):  escitalopram 20 milliGRAM(s) Oral at bedtime  lactated ringers. 1000 milliLiter(s) (150 mL/Hr) IV Continuous <Continuous>  pantoprazole  Injectable 40 milliGRAM(s) IV Push daily  sodium chloride 0.9%. 1000 milliLiter(s) (125 mL/Hr) IV Continuous <Continuous>    MEDICATIONS  (PRN):  acetaminophen     Tablet .. 650 milliGRAM(s) Oral every 6 hours PRN Temp greater or equal to 38C (100.4F), Mild Pain (1 - 3)  albuterol    90 MICROgram(s) HFA Inhaler 2 Puff(s) Inhalation every 6 hours PRN Shortness of Breath  aluminum hydroxide/magnesium hydroxide/simethicone Suspension 30 milliLiter(s) Oral every 4 hours PRN Dyspepsia  benzocaine 15 mG/menthol 3.6 mG Lozenge 1 Lozenge Oral every 2 hours PRN Sore Throat  melatonin 3 milliGRAM(s) Oral at bedtime PRN Insomnia  morphine  - Injectable 2 milliGRAM(s) IV Push every 6 hours PRN Severe Pain (7 - 10)  ondansetron Injectable 4 milliGRAM(s) IV Push every 8 hours PRN Nausea and/or Vomiting  traMADol 25 milliGRAM(s) Oral every 8 hours PRN Moderate Pain (4 - 6)    ASSESSMENT AND PLAN:  22f, PMH as above a/w    1. Acute pancreatitis:  -lipase slowly improving.   -diet advanced.   -GI consult.   -IVF  -PPI.    2. Anxiety:  -escitalopram.     3. Asthma  -prn albuterol.     4. DVT px  -ambulating.     dispo:  GI eval.   dc planning for tomorrow if lipase continues to improve.

## 2023-01-18 NOTE — CONSULT NOTE ADULT - ASSESSMENT
ETOH Pancreatitis  REC  Low Fat diet  Abstinence from ETOH  D/C Planning  Office visit later this week

## 2023-01-18 NOTE — CONSULT NOTE ADULT - SUBJECTIVE AND OBJECTIVE BOX
HPI:  21 yo F with a PMH of anxiety and childhood asthma who presents with aches, N/V, and upper abdominal pain after binge drinking. She had 4 Michelob Ultras on Wednesday 1/11, and the following day she began profusely vomiting. She has since had constant NBNB emesis and has tried to take in Gatorade and soups without avail. She has been urinating a lot. She had subjective fevers, body aches, and diffuse upper abdominal pain. The pain is achy, coming and going, and sometimes radiating down her abdomen, but moderate in intensity. She has not had a BM in about a week. She feels like she has drunk more without any problems in the past. She denies chest pain but endorses palpitations. She also feels dyspneic and has some dizziness, along with malaise.    She does smoke marijuana and vapes sometimes, although she has not done it in the past week.  She does have a history of vomiting in the past and had an EGD in the past which she says showed a gastric polyp but no other abnormalities.    In the ED, she was given Zofran 4 mg IV x1, LR 1L x1, and NS 1L x1. (16 Jan 2023 19:59)      PAST MEDICAL & SURGICAL HISTORY:  Childhood asthma      Anxiety      H/O endoscopy          MEDICATIONS  (STANDING):  escitalopram 20 milliGRAM(s) Oral at bedtime  lactated ringers. 1000 milliLiter(s) (150 mL/Hr) IV Continuous <Continuous>  pantoprazole  Injectable 40 milliGRAM(s) IV Push daily  sodium chloride 0.9%. 1000 milliLiter(s) (125 mL/Hr) IV Continuous <Continuous>    MEDICATIONS  (PRN):  acetaminophen     Tablet .. 650 milliGRAM(s) Oral every 6 hours PRN Temp greater or equal to 38C (100.4F), Mild Pain (1 - 3)  albuterol    90 MICROgram(s) HFA Inhaler 2 Puff(s) Inhalation every 6 hours PRN Shortness of Breath  aluminum hydroxide/magnesium hydroxide/simethicone Suspension 30 milliLiter(s) Oral every 4 hours PRN Dyspepsia  benzocaine 15 mG/menthol 3.6 mG Lozenge 1 Lozenge Oral every 2 hours PRN Sore Throat  melatonin 3 milliGRAM(s) Oral at bedtime PRN Insomnia  morphine  - Injectable 2 milliGRAM(s) IV Push every 6 hours PRN Severe Pain (7 - 10)  ondansetron Injectable 4 milliGRAM(s) IV Push every 8 hours PRN Nausea and/or Vomiting  traMADol 25 milliGRAM(s) Oral every 8 hours PRN Moderate Pain (4 - 6)      Allergies    Allergy Status Unknown    Intolerances        SOCIAL HISTORY:    FAMILY HISTORY:  FH: diabetes mellitus    FH: HTN (hypertension)    FH: hyperlipidemia    Family history of cervical cancer    FH: breast cancer     Non-contributory    REVIEW OF SYSTEMS      General:	    Respiratory and Thorax:  	  Cardiovascular:	    Gastrointestinal:	    Musculoskeletal:	   Vital Signs Last 24 Hrs  T(C): 36.6 (18 Jan 2023 09:02), Max: 37.2 (18 Jan 2023 00:40)  T(F): 97.8 (18 Jan 2023 09:02), Max: 98.9 (18 Jan 2023 00:40)  HR: 62 (18 Jan 2023 09:02) (62 - 80)  BP: 114/56 (18 Jan 2023 09:02) (106/64 - 114/56)  BP(mean): 66 (17 Jan 2023 20:48) (66 - 66)  RR: 18 (18 Jan 2023 09:02) (18 - 18)  SpO2: 100% (18 Jan 2023 09:02) (100% - 100%)    Parameters below as of 18 Jan 2023 09:02  Patient On (Oxygen Delivery Method): room air        HEENT :No Pallor.No icterus. EOMI,PERLAA  Chest : Clear to Auscultation  CVS : S1S2 Normal.No murmurs.  Abdomen: Soft.Epigastric  tender .Normal bowel sounds.No Organomegaly.  CNS: Alert.Oriented to Time,Place and Person.No focal deficit.  EXT: Normal Range of motion.No pitting edema.    LABS:                        12.8   7.41  )-----------( 295      ( 18 Jan 2023 08:44 )             38.8     01-18    140  |  108  |  4<L>  ----------------------------<  90  3.8   |  26  |  0.64    Ca    8.7      18 Jan 2023 08:44  Phos  3.1     01-18  Mg     2.1     01-18    TPro  6.7  /  Alb  3.4  /  TBili  0.4  /  DBili  x   /  AST  16  /  ALT  19  /  AlkPhos  53  01-18      LIVER FUNCTIONS - ( 18 Jan 2023 08:44 )  Alb: 3.4 g/dL / Pro: 6.7 gm/dL / ALK PHOS: 53 U/L / ALT: 19 U/L / AST: 16 U/L / GGT: x             RADIOLOGY & ADDITIONAL STUDIES:

## 2023-01-19 ENCOUNTER — TRANSCRIPTION ENCOUNTER (OUTPATIENT)
Age: 23
End: 2023-01-19

## 2023-01-19 VITALS
HEART RATE: 82 BPM | OXYGEN SATURATION: 100 % | SYSTOLIC BLOOD PRESSURE: 117 MMHG | RESPIRATION RATE: 18 BRPM | DIASTOLIC BLOOD PRESSURE: 62 MMHG | TEMPERATURE: 98 F

## 2023-01-19 LAB
ALBUMIN SERPL ELPH-MCNC: 3.5 G/DL — SIGNIFICANT CHANGE UP (ref 3.3–5)
ALP SERPL-CCNC: 56 U/L — SIGNIFICANT CHANGE UP (ref 40–120)
ALT FLD-CCNC: 19 U/L — SIGNIFICANT CHANGE UP (ref 12–78)
ANION GAP SERPL CALC-SCNC: 6 MMOL/L — SIGNIFICANT CHANGE UP (ref 5–17)
AST SERPL-CCNC: 15 U/L — SIGNIFICANT CHANGE UP (ref 15–37)
BILIRUB SERPL-MCNC: 0.4 MG/DL — SIGNIFICANT CHANGE UP (ref 0.2–1.2)
BUN SERPL-MCNC: 5 MG/DL — LOW (ref 7–23)
CALCIUM SERPL-MCNC: 8.8 MG/DL — SIGNIFICANT CHANGE UP (ref 8.5–10.1)
CHLORIDE SERPL-SCNC: 107 MMOL/L — SIGNIFICANT CHANGE UP (ref 96–108)
CO2 SERPL-SCNC: 25 MMOL/L — SIGNIFICANT CHANGE UP (ref 22–31)
CREAT SERPL-MCNC: 0.63 MG/DL — SIGNIFICANT CHANGE UP (ref 0.5–1.3)
EGFR: 128 ML/MIN/1.73M2 — SIGNIFICANT CHANGE UP
GLUCOSE SERPL-MCNC: 83 MG/DL — SIGNIFICANT CHANGE UP (ref 70–99)
LIDOCAIN IGE QN: 2156 U/L — HIGH (ref 73–393)
MAGNESIUM SERPL-MCNC: 2 MG/DL — SIGNIFICANT CHANGE UP (ref 1.6–2.6)
PHOSPHATE SERPL-MCNC: 2.9 MG/DL — SIGNIFICANT CHANGE UP (ref 2.5–4.5)
POTASSIUM SERPL-MCNC: 4.2 MMOL/L — SIGNIFICANT CHANGE UP (ref 3.5–5.3)
POTASSIUM SERPL-SCNC: 4.2 MMOL/L — SIGNIFICANT CHANGE UP (ref 3.5–5.3)
PROT SERPL-MCNC: 6.7 GM/DL — SIGNIFICANT CHANGE UP (ref 6–8.3)
SODIUM SERPL-SCNC: 138 MMOL/L — SIGNIFICANT CHANGE UP (ref 135–145)

## 2023-01-19 PROCEDURE — 99239 HOSP IP/OBS DSCHRG MGMT >30: CPT

## 2023-01-19 RX ORDER — PANTOPRAZOLE SODIUM 20 MG/1
1 TABLET, DELAYED RELEASE ORAL
Qty: 30 | Refills: 0
Start: 2023-01-19 | End: 2023-02-17

## 2023-01-19 RX ADMIN — PANTOPRAZOLE SODIUM 40 MILLIGRAM(S): 20 TABLET, DELAYED RELEASE ORAL at 08:47

## 2023-01-19 RX ADMIN — SODIUM CHLORIDE 125 MILLILITER(S): 9 INJECTION INTRAMUSCULAR; INTRAVENOUS; SUBCUTANEOUS at 05:03

## 2023-01-19 NOTE — DISCHARGE NOTE PROVIDER - NSDCCPCAREPLAN_GEN_ALL_CORE_FT
PRINCIPAL DISCHARGE DIAGNOSIS  Diagnosis: Pancreatitis  Assessment and Plan of Treatment: prevent recurrence  avoid alcohol.   follow up with GI for repeat lipase levels , if still elevated, will need further imaging of pancreas.

## 2023-01-19 NOTE — DISCHARGE NOTE PROVIDER - CARE PROVIDER_API CALL
Shayna Guajardo  GASTROENTEROLOGY  [address]  [place] [address]  Phone: [phone]  Fax: [phone]  Follow Up Time: 1 week

## 2023-01-19 NOTE — DISCHARGE NOTE PROVIDER - NSDCMRMEDTOKEN_GEN_ALL_CORE_FT
acetaminophen 500 mg oral tablet: 2 tab(s) orally every 8 hours, As Needed for mild pain  Albuterol (Eqv-ProAir HFA) 90 mcg/inh inhalation aerosol: 2 puff(s) inhaled every 6 hours, As Needed  escitalopram 20 mg oral tablet: 1 tab(s) orally once a day (at bedtime)  ondansetron 4 mg oral tablet: 1 tab(s) orally every 8 hours, As Needed  Protonix 40 mg oral delayed release tablet: 1 tab(s) orally once a day

## 2023-01-19 NOTE — DISCHARGE NOTE PROVIDER - HOSPITAL COURSE
22F with a PMH of anxiety and childhood asthma who presents with aches, N/V, and upper abdominal pain after binge drinking. She had 4 Michelob Ultras on Wednesday 1/11, and the following day she began profusely vomiting. She has since had constant NBNB emesis and has tried to take in Gatorade and soups without avail. She has been urinating a lot. She had subjective fevers, body aches, and diffuse upper abdominal pain. The pain is achy, coming and going, and sometimes radiating down her abdomen, but moderate in intensity. She has not had a BM in about a week. She feels like she has drank more without any problems in the past. She denies chest pain but endorses palpitations. She also feels dyspneic and has some dizziness, along with malaise.  She does smoke marijuana and vapes sometimes, although she has not done it in the past week.  She does have a history of vomiting in the past and had an EGD in the past which she says showed a gastric polyp but no other abnormalities.   she is admitted with Acute pancreatitis.   treated with ivf, diet was slowly advanced which she is tolerating. no further n/v. Ambulating without difficulty. She would like to go home asap.   Her lipase Is slowly improving. she was seen by GI while here and was recommended outpt f/u.   Pt was advised her lipase is still not normal and she will need f/u as outpt to repeat blood work to ensure it decreases to normal. If not, she will need further imaging of her pancreas.   she will be discharged on ppi for gastritis as well.    Vital Signs Last 24 Hrs  T(C): 36.4 (19 Jan 2023 08:45), Max: 36.7 (19 Jan 2023 00:14)  T(F): 97.5 (19 Jan 2023 08:45), Max: 98 (19 Jan 2023 00:14)  HR: 82 (19 Jan 2023 08:45) (61 - 82)  BP: 117/62 (19 Jan 2023 08:45) (114/68 - 117/62)  RR: 18 (19 Jan 2023 08:45) (18 - 18)  SpO2: 100% (19 Jan 2023 08:45) (100% - 100%)    Parameters below as of 19 Jan 2023 08:45  Patient On (Oxygen Delivery Method): room air    PHYSICAL EXAM:    GENERAL: Comfortable, no acute distress   HEAD:  Normocephalic, atraumatic  EYES: EOMI, PERRLA  HEENT: Moist mucous membranes  NECK: Supple, No JVD  NERVOUS SYSTEM:  Alert & Oriented X3, Motor Strength 5/5 B/L upper and lower extremities  CHEST/LUNG: Clear to auscultation bilaterally  HEART: Regular rate and rhythm  ABDOMEN: Soft, Nontender, Nondistended, Bowel sounds present  GENITOURINARY: Voiding, no palpable bladder  EXTREMITIES:   No clubbing, cyanosis, or edema  MUSCULOSKELETAL- No muscle tenderness, no joint tenderness  SKIN-no rash    LABS:                        12.8   7.41  )-----------( 295      ( 18 Jan 2023 08:44 )             38.8     01-19    138  |  107  |  5<L>  ----------------------------<  83  4.2   |  25  |  0.63    Ca    8.8      19 Jan 2023 08:35  Phos  2.9     01-19  Mg     2.0     01-19    TPro  6.7  /  Alb  3.5  /  TBili  0.4  /  DBili  x   /  AST  15  /  ALT  19  /  AlkPhos  56  01-19    US Abdomen Upper Quadrant Right (01.16.23 @ 16:37) >  IMPRESSION:  Normal right upper quadrant abdominal ultrasound.    FINAL DIAGNOSIS:  1. ACUTE PANCREATITIS  2. GASTRITIS  3. PANCREATITIS.  4. ANXIETY.  5. ASTHMA    Time taken for dc 42 min  d/w pt  summary to be faxed to pcp .

## 2023-01-19 NOTE — DISCHARGE NOTE NURSING/CASE MANAGEMENT/SOCIAL WORK - PATIENT PORTAL LINK FT
You can access the FollowMyHealth Patient Portal offered by [facility] by registering at the following website: [email or web address]. By joining "[email or web address]"’s FollowMyHealth portal, you will also be able to view your health information using other applications (apps) compatible with our system.

## 2023-01-19 NOTE — DISCHARGE NOTE NURSING/CASE MANAGEMENT/SOCIAL WORK - NSDCPEFALRISK_GEN_ALL_CORE
For information on Fall & Injury Prevention, visit: [email or web address] OR  [email or web address] OR  https://www.cdc.gov/steadi/patient.html

## 2023-01-20 NOTE — CDI QUERY NOTE - NSCDIOTHERTXTBX_GEN_ALL_CORE_HH
The Physician's or Provider's documentation of the patient's presentation, evaluation and medical management, as identified below, may support a diagnosis that is not documented to the furthest specificity in the medical record. Please clarify in your progress notes and/or discharge summary if there is a corresponding diagnosis associated with the clinical information described below:    Could you please further clarify the diagnosis associated with documented  low BMI  19.9    -Underweight  -Unable to rule out underweight  -Cachexia  -Other please specify      SUPPORTING DOCUMENTATION AND/OR CLINICAL EVIDENCE: BMI 19.9    H&P-21 yo F with a PMH of anxiety and childhood asthma who presents with aches, N/V, and upper abdominal pain after binge drinking. She had 4 Michelob Ultras on Wednesday 1/11, and the following day she began profusely vomiting. She has since had constant NBNB emesis and has tried to take in Gatorade and soups without avail. She has been urinating a lot. She had subjective fevers, body aches, and diffuse upper abdominal pain. The pain is achy, coming and going, and sometimes radiating down her abdomen, but moderate in intensity. She has not had a BM in about a week. She feels like she has drunk more without any problems in the past. She denies chest pain but endorses palpitations. She also feels dyspneic and has some dizziness, along with malaise.She does have a history of vomiting in the past and had an EGD in the past which she says showed a gastric polyp but no other abnormalities.In the ED, she was given Zofran 4 mg IV x1, LR 1L x1, and NS 1L x1.  Resp: + dyspnea. No wheezing, pleuritic chest pain, or hemoptysis  CV: + palpitations. No chest pain  GI: + nausea, vomiting, abdominal pain. No diarrhea, melena, or hematochezia  : + increased frequency. No dysuria, hematuria, or incontinence    DC summary-PRINCIPAL DISCHARGE DIAGNOSISDiagnosis: PancreatitisAssessment and Plan of Treatment: prevent recurrenceavoid alcohol. follow up with GI for repeat lipase levels , if still elevated, will need further imaging of pancreas. The Physician's or Provider's documentation of the patient's presentation, evaluation and medical management, as identified below, may support a diagnosis that is not documented to the furthest specificity in the medical record. Please clarify in your progress notes and/or discharge summary if there is a corresponding diagnosis associated with the clinical information described below:    Could you please further clarify the diagnosis associated with documented  low BMI  19.4    -Underweight  -Unable to rule out underweight  -Cachexia  -Other please specify      SUPPORTING DOCUMENTATION AND/OR CLINICAL EVIDENCE: BMI 19.4    Mon Jan 16 2023 14:59:00 [id number] Review of Systems: Gen:  + subjective fevers, weight loss, malaise, fatigue      H&P-23 yo F with a PMH of anxiety and childhood asthma who presents with aches, N/V, and upper abdominal pain after binge drinking. She had 4 Michelob Ultras on Wednesday 1/11, and the following day she began profusely vomiting. She has since had constant NBNB emesis and has tried to take in Gatorade and soups without avail. She has been urinating a lot. She had subjective fevers, body aches, and diffuse upper abdominal pain. The pain is achy, coming and going, and sometimes radiating down her abdomen, but moderate in intensity. She has not had a BM in about a week. She feels like she has drunk more without any problems in the past. She denies chest pain but endorses palpitations. She also feels dyspneic and has some dizziness, along with malaise.She does have a history of vomiting in the past and had an EGD in the past which she says showed a gastric polyp but no other abnormalities.In the ED, she was given Zofran 4 mg IV x1, LR 1L x1, and NS 1L x1.  Resp: + dyspnea. No wheezing, pleuritic chest pain, or hemoptysis  CV: + palpitations. No chest pain  GI: + nausea, vomiting, abdominal pain. No diarrhea, melena, or hematochezia  : + increased frequency. No dysuria, hematuria, or incontinence    DC summary-PRINCIPAL DISCHARGE DIAGNOSISDiagnosis: PancreatitisAssessment and Plan of Treatment: prevent recurrenceavoid alcohol. follow up with GI for repeat lipase levels , if still elevated, will need further imaging of pancreas.

## 2023-01-26 DIAGNOSIS — F41.9 ANXIETY DISORDER, UNSPECIFIED: ICD-10-CM

## 2023-01-26 DIAGNOSIS — K63.5 POLYP OF COLON: ICD-10-CM

## 2023-01-26 DIAGNOSIS — Z87.891 PERSONAL HISTORY OF NICOTINE DEPENDENCE: ICD-10-CM

## 2023-01-26 DIAGNOSIS — J45.909 UNSPECIFIED ASTHMA, UNCOMPLICATED: ICD-10-CM

## 2023-01-26 DIAGNOSIS — K29.70 GASTRITIS, UNSPECIFIED, WITHOUT BLEEDING: ICD-10-CM

## 2023-01-26 DIAGNOSIS — F12.99 CANNABIS USE, UNSPECIFIED WITH UNSPECIFIED CANNABIS-INDUCED DISORDER: ICD-10-CM

## 2023-01-26 DIAGNOSIS — Z83.3 FAMILY HISTORY OF DIABETES MELLITUS: ICD-10-CM

## 2023-01-26 DIAGNOSIS — Z82.49 FAMILY HISTORY OF ISCHEMIC HEART DISEASE AND OTHER DISEASES OF THE CIRCULATORY SYSTEM: ICD-10-CM

## 2023-01-26 DIAGNOSIS — K85.90 ACUTE PANCREATITIS WITHOUT NECROSIS OR INFECTION, UNSPECIFIED: ICD-10-CM

## 2023-01-26 DIAGNOSIS — Z80.3 FAMILY HISTORY OF MALIGNANT NEOPLASM OF BREAST: ICD-10-CM

## 2023-01-26 DIAGNOSIS — K85.20 ALCOHOL INDUCED ACUTE PANCREATITIS WITHOUT NECROSIS OR INFECTION: ICD-10-CM

## 2023-10-01 ENCOUNTER — EMERGENCY (EMERGENCY)
Facility: HOSPITAL | Age: 23
LOS: 0 days | Discharge: HOME | End: 2023-10-01
Attending: STUDENT IN AN ORGANIZED HEALTH CARE EDUCATION/TRAINING PROGRAM
Payer: COMMERCIAL

## 2023-10-01 VITALS
HEART RATE: 85 BPM | DIASTOLIC BLOOD PRESSURE: 78 MMHG | SYSTOLIC BLOOD PRESSURE: 130 MMHG | RESPIRATION RATE: 18 BRPM | OXYGEN SATURATION: 99 % | TEMPERATURE: 98 F

## 2023-10-01 VITALS — WEIGHT: 130.07 LBS | HEIGHT: 64 IN

## 2023-10-01 DIAGNOSIS — R11.2 NAUSEA WITH VOMITING, UNSPECIFIED: ICD-10-CM

## 2023-10-01 DIAGNOSIS — F41.9 ANXIETY DISORDER, UNSPECIFIED: ICD-10-CM

## 2023-10-01 DIAGNOSIS — R10.10 UPPER ABDOMINAL PAIN, UNSPECIFIED: ICD-10-CM

## 2023-10-01 DIAGNOSIS — J45.909 UNSPECIFIED ASTHMA, UNCOMPLICATED: ICD-10-CM

## 2023-10-01 DIAGNOSIS — Z98.890 OTHER SPECIFIED POSTPROCEDURAL STATES: Chronic | ICD-10-CM

## 2023-10-01 DIAGNOSIS — Z87.19 PERSONAL HISTORY OF OTHER DISEASES OF THE DIGESTIVE SYSTEM: ICD-10-CM

## 2023-10-01 DIAGNOSIS — F17.200 NICOTINE DEPENDENCE, UNSPECIFIED, UNCOMPLICATED: ICD-10-CM

## 2023-10-01 LAB
ALBUMIN SERPL ELPH-MCNC: 4.6 G/DL — SIGNIFICANT CHANGE UP (ref 3.3–5)
ALP SERPL-CCNC: 90 U/L — SIGNIFICANT CHANGE UP (ref 40–120)
ALT FLD-CCNC: 28 U/L — SIGNIFICANT CHANGE UP (ref 12–78)
ANION GAP SERPL CALC-SCNC: 7 MMOL/L — SIGNIFICANT CHANGE UP (ref 5–17)
AST SERPL-CCNC: 20 U/L — SIGNIFICANT CHANGE UP (ref 15–37)
BASOPHILS # BLD AUTO: 0.03 K/UL — SIGNIFICANT CHANGE UP (ref 0–0.2)
BASOPHILS NFR BLD AUTO: 0.2 % — SIGNIFICANT CHANGE UP (ref 0–2)
BILIRUB SERPL-MCNC: 0.5 MG/DL — SIGNIFICANT CHANGE UP (ref 0.2–1.2)
BUN SERPL-MCNC: 14 MG/DL — SIGNIFICANT CHANGE UP (ref 7–23)
CALCIUM SERPL-MCNC: 9.6 MG/DL — SIGNIFICANT CHANGE UP (ref 8.5–10.1)
CHLORIDE SERPL-SCNC: 104 MMOL/L — SIGNIFICANT CHANGE UP (ref 96–108)
CO2 SERPL-SCNC: 26 MMOL/L — SIGNIFICANT CHANGE UP (ref 22–31)
CREAT SERPL-MCNC: 0.93 MG/DL — SIGNIFICANT CHANGE UP (ref 0.5–1.3)
EGFR: 88 ML/MIN/1.73M2 — SIGNIFICANT CHANGE UP
EOSINOPHIL # BLD AUTO: 0 K/UL — SIGNIFICANT CHANGE UP (ref 0–0.5)
EOSINOPHIL NFR BLD AUTO: 0 % — SIGNIFICANT CHANGE UP (ref 0–6)
GLUCOSE SERPL-MCNC: 113 MG/DL — HIGH (ref 70–99)
HCG SERPL-ACNC: <1 MIU/ML — SIGNIFICANT CHANGE UP
HCT VFR BLD CALC: 40.2 % — SIGNIFICANT CHANGE UP (ref 34.5–45)
HGB BLD-MCNC: 14.2 G/DL — SIGNIFICANT CHANGE UP (ref 11.5–15.5)
IMM GRANULOCYTES NFR BLD AUTO: 0.6 % — SIGNIFICANT CHANGE UP (ref 0–0.9)
LIDOCAIN IGE QN: 33 U/L — SIGNIFICANT CHANGE UP (ref 13–75)
LYMPHOCYTES # BLD AUTO: 1.08 K/UL — SIGNIFICANT CHANGE UP (ref 1–3.3)
LYMPHOCYTES # BLD AUTO: 8.5 % — LOW (ref 13–44)
MAGNESIUM SERPL-MCNC: 2.3 MG/DL — SIGNIFICANT CHANGE UP (ref 1.6–2.6)
MCHC RBC-ENTMCNC: 30.9 PG — SIGNIFICANT CHANGE UP (ref 27–34)
MCHC RBC-ENTMCNC: 35.3 GM/DL — SIGNIFICANT CHANGE UP (ref 32–36)
MCV RBC AUTO: 87.4 FL — SIGNIFICANT CHANGE UP (ref 80–100)
MONOCYTES # BLD AUTO: 1.37 K/UL — HIGH (ref 0–0.9)
MONOCYTES NFR BLD AUTO: 10.8 % — SIGNIFICANT CHANGE UP (ref 2–14)
NEUTROPHILS # BLD AUTO: 10.16 K/UL — HIGH (ref 1.8–7.4)
NEUTROPHILS NFR BLD AUTO: 79.9 % — HIGH (ref 43–77)
PLATELET # BLD AUTO: 360 K/UL — SIGNIFICANT CHANGE UP (ref 150–400)
POTASSIUM SERPL-MCNC: 3 MMOL/L — LOW (ref 3.5–5.3)
POTASSIUM SERPL-SCNC: 3 MMOL/L — LOW (ref 3.5–5.3)
PROT SERPL-MCNC: 8.5 GM/DL — HIGH (ref 6–8.3)
RBC # BLD: 4.6 M/UL — SIGNIFICANT CHANGE UP (ref 3.8–5.2)
RBC # FLD: 14.3 % — SIGNIFICANT CHANGE UP (ref 10.3–14.5)
SODIUM SERPL-SCNC: 137 MMOL/L — SIGNIFICANT CHANGE UP (ref 135–145)
WBC # BLD: 12.72 K/UL — HIGH (ref 3.8–10.5)
WBC # FLD AUTO: 12.72 K/UL — HIGH (ref 3.8–10.5)

## 2023-10-01 PROCEDURE — 85025 COMPLETE CBC W/AUTO DIFF WBC: CPT

## 2023-10-01 PROCEDURE — 36415 COLL VENOUS BLD VENIPUNCTURE: CPT

## 2023-10-01 PROCEDURE — 83735 ASSAY OF MAGNESIUM: CPT

## 2023-10-01 PROCEDURE — 96375 TX/PRO/DX INJ NEW DRUG ADDON: CPT

## 2023-10-01 PROCEDURE — 83690 ASSAY OF LIPASE: CPT

## 2023-10-01 PROCEDURE — 96374 THER/PROPH/DIAG INJ IV PUSH: CPT

## 2023-10-01 PROCEDURE — 99284 EMERGENCY DEPT VISIT MOD MDM: CPT | Mod: 25

## 2023-10-01 PROCEDURE — 84702 CHORIONIC GONADOTROPIN TEST: CPT

## 2023-10-01 PROCEDURE — 99284 EMERGENCY DEPT VISIT MOD MDM: CPT

## 2023-10-01 PROCEDURE — 80053 COMPREHEN METABOLIC PANEL: CPT

## 2023-10-01 PROCEDURE — 96376 TX/PRO/DX INJ SAME DRUG ADON: CPT

## 2023-10-01 RX ORDER — POTASSIUM CHLORIDE 20 MEQ
10 PACKET (EA) ORAL
Refills: 0 | Status: COMPLETED | OUTPATIENT
Start: 2023-10-01 | End: 2023-10-01

## 2023-10-01 RX ORDER — ACETAMINOPHEN 500 MG
1000 TABLET ORAL ONCE
Refills: 0 | Status: COMPLETED | OUTPATIENT
Start: 2023-10-01 | End: 2023-10-01

## 2023-10-01 RX ORDER — ONDANSETRON 8 MG/1
1 TABLET, FILM COATED ORAL
Qty: 2 | Refills: 0
Start: 2023-10-01 | End: 2023-10-05

## 2023-10-01 RX ORDER — POTASSIUM CHLORIDE 20 MEQ
40 PACKET (EA) ORAL ONCE
Refills: 0 | Status: COMPLETED | OUTPATIENT
Start: 2023-10-01 | End: 2023-10-01

## 2023-10-01 RX ORDER — ONDANSETRON 8 MG/1
4 TABLET, FILM COATED ORAL ONCE
Refills: 0 | Status: COMPLETED | OUTPATIENT
Start: 2023-10-01 | End: 2023-10-01

## 2023-10-01 RX ORDER — SODIUM CHLORIDE 9 MG/ML
1000 INJECTION INTRAMUSCULAR; INTRAVENOUS; SUBCUTANEOUS ONCE
Refills: 0 | Status: COMPLETED | OUTPATIENT
Start: 2023-10-01 | End: 2023-10-01

## 2023-10-01 RX ADMIN — Medication 100 MILLIEQUIVALENT(S): at 20:02

## 2023-10-01 RX ADMIN — Medication 400 MILLIGRAM(S): at 19:11

## 2023-10-01 RX ADMIN — Medication 40 MILLIEQUIVALENT(S): at 21:41

## 2023-10-01 RX ADMIN — SODIUM CHLORIDE 1000 MILLILITER(S): 9 INJECTION INTRAMUSCULAR; INTRAVENOUS; SUBCUTANEOUS at 19:11

## 2023-10-01 RX ADMIN — ONDANSETRON 4 MILLIGRAM(S): 8 TABLET, FILM COATED ORAL at 19:11

## 2023-10-01 RX ADMIN — Medication 100 MILLIEQUIVALENT(S): at 21:10

## 2023-10-01 NOTE — ED ADULT NURSE NOTE - CHIEF COMPLAINT QUOTE
Patient presents complaining of vomiting for 24 hours. denies abdominal pain. unable to tolerate PO intake. patient has hx of pancreatitis.

## 2023-10-01 NOTE — ED STATDOCS - ATTENDING APP SHARED VISIT CONTRIBUTION OF CARE
Dr. Hogan: I performed a face to face bedside interview with patient regarding history of present illness, review of symptoms and past medical history. I completed an independent physical exam.  I have discussed patient's plan of care with PA.   I agree with note as stated above, having amended the EMR as needed to reflect my findings.   This includes HISTORY OF PRESENT ILLNESS, HIV, PAST MEDICAL/SURGICAL/FAMILY/SOCIAL HISTORY, ALLERGIES AND HOME MEDICATIONS, REVIEW OF SYSTEMS, PHYSICAL EXAM, and any PROGRESS NOTES during the time I functioned as the attending physician for this patient.

## 2023-10-01 NOTE — ED STATDOCS - CLINICAL SUMMARY MEDICAL DECISION MAKING FREE TEXT BOX
22 yo f hx pancreatitis c/o N/V, and upper abdominal pain after binge drinking. Since yesterday pt has since had constant NBNB emesis and has tried to take in Gatorade The pain is achy, coming and going, and sometimes radiating down her abdomen, but moderate in intensity. denies cp, sob, diarrhea, headahce, numbness, tingling, dysuria  will check basic labs and lipase, pain meds, ivf, zofran, reassess 24 yo f hx pancreatitis c/o N/V, and upper abdominal pain after binge drinking. Since yesterday pt has since had constant NBNB emesis and has tried to take in Gatorade The pain is achy, coming and going, and sometimes radiating down her abdomen, but moderate in intensity. denies cp, sob, diarrhea, headahce, numbness, tingling, dysuria  will check basic labs and lipase, pain meds, ivf, zofran, reassess  pts labs show low K. 2 runs given and 1 PO. pt tolerated po. zofran sent to pharmacy  Discussed with patient need to return to ED if symptoms don't continue to improve or recur or develops any new or worsening symptoms that are of concern.

## 2023-10-01 NOTE — ED ADULT NURSE NOTE - OBJECTIVE STATEMENT
Patient presents to the ER with complaints of vomiting after binge drinking liquor and beer. Patient ambulatory, denies diarrhea, fever, chills.

## 2023-10-01 NOTE — ED STATDOCS - PHYSICAL EXAMINATION
General:     NAD   Eyes: PERRL  Head:     NC/AT, dry oral mucosa  Neck:     trachea midline  Lungs:     CTA b/l  CVS:     RRR  Abd:     diffuse tenderness wo rebound or guarding, no cva tenderness, no ecchymosis   Ext:   no deformities   Neuro: AAOx3

## 2023-10-01 NOTE — ED STATDOCS - NSFOLLOWUPINSTRUCTIONS_ED_ALL_ED_FT
Nausea / Vomiting    Nausea is the feeling that you have to vomit. As nausea gets worse, it can lead to vomiting. Vomiting puts you at an increased risk for dehydration. Older adults and people with other diseases or a weak immune system are at higher risk for dehydration. Drink clear fluids in small but frequent amounts as tolerated. Eat bland, easy-to-digest foods in small amounts as tolerated.    SEEK IMMEDIATE MEDICAL CARE IF YOU HAVE ANY OF THE FOLLOWING SYMPTOMS: fever, inability to keep sufficient fluids down, black or bloody vomitus, black or bloody stools, lightheadedness/dizziness, chest pain, severe headache, rash, shortness of breath, cold or clammy skin, confusion, pain with urination, or any signs of dehydration.     Please follow-up with your doctor(s) within the next 3 days, but see medical sooner if your symptoms persist or worsen.  Please call tomorrow for an appointment.    You were given a copy of your labs and/or imaging.  Please go-over these with your doctor(s).     If you have any worsening of symptoms or any other concerns please see your doctor or return to the ED immediately.    Please continue taking your home medications as directed.  Do not use alcohol when taking any medication (especially antibiotics, tylenol or other pain medication) unless you check with the doctor or pharmacist.

## 2023-10-01 NOTE — ED STATDOCS - PATIENT PORTAL LINK FT
You can access the FollowMyHealth Patient Portal offered by [facility] by registering at the following website: [email or web address]. By joining [employer]’s FollowMyHealth portal, you will also be able to view your health information using other applications (apps) compatible with our system.

## 2023-10-01 NOTE — ED STATDOCS - PROGRESS NOTE DETAILS
Patient well-appearing stable for discharge.  Tolerating p.o.  Come back to the ER for any worsening symptoms.

## 2023-10-01 NOTE — ED STATDOCS - OBJECTIVE STATEMENT
24 yo f hx pancreatitis c/o N/V, and upper abdominal pain after binge drinking. Since yesterday pt has since had constant NBNB emesis and has tried to take in Gatorade The pain is achy, coming and going, and sometimes radiating down her abdomen, but moderate in intensity. denies cp, sob, diarrhea, headahce, numbness, tingling, dysuria 22 yo f hx pancreatitis c/o N/V, and upper abdominal pain after binge drinking. Since yesterday pt has since had constant NBNB emesis and has tried to take in Gatorade The pain is achy, coming and going, and sometimes radiating down her abdomen, but moderate in intensity. denies cp, sob, diarrhea, headahce, numbness, tingling, dysuria, fever, back pain

## 2023-10-02 ENCOUNTER — INPATIENT (INPATIENT)
Facility: HOSPITAL | Age: 23
LOS: 3 days | Discharge: HOME | DRG: 395 | End: 2023-10-06
Attending: STUDENT IN AN ORGANIZED HEALTH CARE EDUCATION/TRAINING PROGRAM | Admitting: INTERNAL MEDICINE
Payer: COMMERCIAL

## 2023-10-02 VITALS — HEIGHT: 64 IN | WEIGHT: 130.07 LBS

## 2023-10-02 DIAGNOSIS — R11.10 VOMITING, UNSPECIFIED: ICD-10-CM

## 2023-10-02 DIAGNOSIS — Z98.890 OTHER SPECIFIED POSTPROCEDURAL STATES: Chronic | ICD-10-CM

## 2023-10-02 PROBLEM — F41.9 ANXIETY DISORDER, UNSPECIFIED: Chronic | Status: ACTIVE | Noted: 2023-01-16

## 2023-10-02 PROBLEM — J45.909 UNSPECIFIED ASTHMA, UNCOMPLICATED: Chronic | Status: ACTIVE | Noted: 2023-01-16

## 2023-10-02 LAB
ALBUMIN SERPL ELPH-MCNC: 4.1 G/DL — SIGNIFICANT CHANGE UP (ref 3.3–5)
ALP SERPL-CCNC: 82 U/L — SIGNIFICANT CHANGE UP (ref 40–120)
ALT FLD-CCNC: 28 U/L — SIGNIFICANT CHANGE UP (ref 12–78)
ANION GAP SERPL CALC-SCNC: 4 MMOL/L — LOW (ref 5–17)
AST SERPL-CCNC: 19 U/L — SIGNIFICANT CHANGE UP (ref 15–37)
BASOPHILS # BLD AUTO: 0.05 K/UL — SIGNIFICANT CHANGE UP (ref 0–0.2)
BASOPHILS NFR BLD AUTO: 0.4 % — SIGNIFICANT CHANGE UP (ref 0–2)
BILIRUB SERPL-MCNC: 0.5 MG/DL — SIGNIFICANT CHANGE UP (ref 0.2–1.2)
BUN SERPL-MCNC: 9 MG/DL — SIGNIFICANT CHANGE UP (ref 7–23)
CALCIUM SERPL-MCNC: 9.7 MG/DL — SIGNIFICANT CHANGE UP (ref 8.5–10.1)
CHLORIDE SERPL-SCNC: 105 MMOL/L — SIGNIFICANT CHANGE UP (ref 96–108)
CO2 SERPL-SCNC: 29 MMOL/L — SIGNIFICANT CHANGE UP (ref 22–31)
CREAT SERPL-MCNC: 0.73 MG/DL — SIGNIFICANT CHANGE UP (ref 0.5–1.3)
EGFR: 118 ML/MIN/1.73M2 — SIGNIFICANT CHANGE UP
EOSINOPHIL # BLD AUTO: 0 K/UL — SIGNIFICANT CHANGE UP (ref 0–0.5)
EOSINOPHIL NFR BLD AUTO: 0 % — SIGNIFICANT CHANGE UP (ref 0–6)
GLUCOSE SERPL-MCNC: 98 MG/DL — SIGNIFICANT CHANGE UP (ref 70–99)
HCG SERPL-ACNC: <1 MIU/ML — SIGNIFICANT CHANGE UP
HCT VFR BLD CALC: 40.1 % — SIGNIFICANT CHANGE UP (ref 34.5–45)
HGB BLD-MCNC: 13.9 G/DL — SIGNIFICANT CHANGE UP (ref 11.5–15.5)
IMM GRANULOCYTES NFR BLD AUTO: 0.7 % — SIGNIFICANT CHANGE UP (ref 0–0.9)
LIDOCAIN IGE QN: 26 U/L — SIGNIFICANT CHANGE UP (ref 13–75)
LYMPHOCYTES # BLD AUTO: 15.7 % — SIGNIFICANT CHANGE UP (ref 13–44)
LYMPHOCYTES # BLD AUTO: 2.08 K/UL — SIGNIFICANT CHANGE UP (ref 1–3.3)
MAGNESIUM SERPL-MCNC: 2.2 MG/DL — SIGNIFICANT CHANGE UP (ref 1.6–2.6)
MCHC RBC-ENTMCNC: 30.5 PG — SIGNIFICANT CHANGE UP (ref 27–34)
MCHC RBC-ENTMCNC: 34.7 GM/DL — SIGNIFICANT CHANGE UP (ref 32–36)
MCV RBC AUTO: 87.9 FL — SIGNIFICANT CHANGE UP (ref 80–100)
MONOCYTES # BLD AUTO: 1.47 K/UL — HIGH (ref 0–0.9)
MONOCYTES NFR BLD AUTO: 11.1 % — SIGNIFICANT CHANGE UP (ref 2–14)
NEUTROPHILS # BLD AUTO: 9.53 K/UL — HIGH (ref 1.8–7.4)
NEUTROPHILS NFR BLD AUTO: 72.1 % — SIGNIFICANT CHANGE UP (ref 43–77)
PLATELET # BLD AUTO: 339 K/UL — SIGNIFICANT CHANGE UP (ref 150–400)
POTASSIUM SERPL-MCNC: 4 MMOL/L — SIGNIFICANT CHANGE UP (ref 3.5–5.3)
POTASSIUM SERPL-SCNC: 4 MMOL/L — SIGNIFICANT CHANGE UP (ref 3.5–5.3)
PROT SERPL-MCNC: 8.1 GM/DL — SIGNIFICANT CHANGE UP (ref 6–8.3)
RBC # BLD: 4.56 M/UL — SIGNIFICANT CHANGE UP (ref 3.8–5.2)
RBC # FLD: 14.5 % — SIGNIFICANT CHANGE UP (ref 10.3–14.5)
SODIUM SERPL-SCNC: 138 MMOL/L — SIGNIFICANT CHANGE UP (ref 135–145)
WBC # BLD: 13.22 K/UL — HIGH (ref 3.8–10.5)
WBC # FLD AUTO: 13.22 K/UL — HIGH (ref 3.8–10.5)

## 2023-10-02 PROCEDURE — 83735 ASSAY OF MAGNESIUM: CPT

## 2023-10-02 PROCEDURE — 70450 CT HEAD/BRAIN W/O DYE: CPT | Mod: 26,MA

## 2023-10-02 PROCEDURE — 93010 ELECTROCARDIOGRAM REPORT: CPT

## 2023-10-02 PROCEDURE — 80048 BASIC METABOLIC PNL TOTAL CA: CPT

## 2023-10-02 PROCEDURE — 36415 COLL VENOUS BLD VENIPUNCTURE: CPT

## 2023-10-02 PROCEDURE — 85027 COMPLETE CBC AUTOMATED: CPT

## 2023-10-02 PROCEDURE — 99223 1ST HOSP IP/OBS HIGH 75: CPT

## 2023-10-02 PROCEDURE — 81001 URINALYSIS AUTO W/SCOPE: CPT

## 2023-10-02 PROCEDURE — 99285 EMERGENCY DEPT VISIT HI MDM: CPT

## 2023-10-02 PROCEDURE — 74177 CT ABD & PELVIS W/CONTRAST: CPT

## 2023-10-02 RX ORDER — SODIUM CHLORIDE 9 MG/ML
1000 INJECTION INTRAMUSCULAR; INTRAVENOUS; SUBCUTANEOUS ONCE
Refills: 0 | Status: COMPLETED | OUTPATIENT
Start: 2023-10-02 | End: 2023-10-02

## 2023-10-02 RX ORDER — LANOLIN ALCOHOL/MO/W.PET/CERES
3 CREAM (GRAM) TOPICAL AT BEDTIME
Refills: 0 | Status: DISCONTINUED | OUTPATIENT
Start: 2023-10-02 | End: 2023-10-06

## 2023-10-02 RX ORDER — ENOXAPARIN SODIUM 100 MG/ML
40 INJECTION SUBCUTANEOUS EVERY 24 HOURS
Refills: 0 | Status: DISCONTINUED | OUTPATIENT
Start: 2023-10-02 | End: 2023-10-06

## 2023-10-02 RX ORDER — SODIUM CHLORIDE 9 MG/ML
1000 INJECTION INTRAMUSCULAR; INTRAVENOUS; SUBCUTANEOUS
Refills: 0 | Status: COMPLETED | OUTPATIENT
Start: 2023-10-02 | End: 2023-10-03

## 2023-10-02 RX ORDER — ONDANSETRON 8 MG/1
1 TABLET, FILM COATED ORAL
Qty: 0 | Refills: 0 | DISCHARGE

## 2023-10-02 RX ORDER — ONDANSETRON 8 MG/1
4 TABLET, FILM COATED ORAL ONCE
Refills: 0 | Status: COMPLETED | OUTPATIENT
Start: 2023-10-02 | End: 2023-10-02

## 2023-10-02 RX ORDER — ALBUTEROL 90 UG/1
2 AEROSOL, METERED ORAL
Qty: 0 | Refills: 0 | DISCHARGE

## 2023-10-02 RX ORDER — ACETAMINOPHEN 500 MG
650 TABLET ORAL EVERY 6 HOURS
Refills: 0 | Status: DISCONTINUED | OUTPATIENT
Start: 2023-10-02 | End: 2023-10-06

## 2023-10-02 RX ORDER — ONDANSETRON 8 MG/1
8 TABLET, FILM COATED ORAL EVERY 8 HOURS
Refills: 0 | Status: DISCONTINUED | OUTPATIENT
Start: 2023-10-02 | End: 2023-10-03

## 2023-10-02 RX ORDER — ACETAMINOPHEN 500 MG
2 TABLET ORAL
Qty: 0 | Refills: 0 | DISCHARGE

## 2023-10-02 RX ORDER — SODIUM CHLORIDE 9 MG/ML
1000 INJECTION, SOLUTION INTRAVENOUS
Refills: 0 | Status: DISCONTINUED | OUTPATIENT
Start: 2023-10-02 | End: 2023-10-03

## 2023-10-02 RX ORDER — METOCLOPRAMIDE HCL 10 MG
10 TABLET ORAL ONCE
Refills: 0 | Status: COMPLETED | OUTPATIENT
Start: 2023-10-02 | End: 2023-10-02

## 2023-10-02 RX ORDER — ESCITALOPRAM OXALATE 10 MG/1
20 TABLET, FILM COATED ORAL AT BEDTIME
Refills: 0 | Status: DISCONTINUED | OUTPATIENT
Start: 2023-10-02 | End: 2023-10-06

## 2023-10-02 RX ORDER — ESCITALOPRAM OXALATE 10 MG/1
1 TABLET, FILM COATED ORAL
Qty: 0 | Refills: 0 | DISCHARGE

## 2023-10-02 RX ADMIN — ONDANSETRON 4 MILLIGRAM(S): 8 TABLET, FILM COATED ORAL at 17:50

## 2023-10-02 RX ADMIN — SODIUM CHLORIDE 1000 MILLILITER(S): 9 INJECTION INTRAMUSCULAR; INTRAVENOUS; SUBCUTANEOUS at 17:50

## 2023-10-02 RX ADMIN — Medication 650 MILLIGRAM(S): at 23:35

## 2023-10-02 RX ADMIN — Medication 10 MILLIGRAM(S): at 13:37

## 2023-10-02 RX ADMIN — Medication 0.5 MILLIGRAM(S): at 13:37

## 2023-10-02 RX ADMIN — ONDANSETRON 4 MILLIGRAM(S): 8 TABLET, FILM COATED ORAL at 23:35

## 2023-10-02 RX ADMIN — SODIUM CHLORIDE 1000 MILLILITER(S): 9 INJECTION INTRAMUSCULAR; INTRAVENOUS; SUBCUTANEOUS at 13:38

## 2023-10-02 NOTE — ED ADULT TRIAGE NOTE - CHIEF COMPLAINT QUOTE
Pt returns to the ED for nausea and vomiting. Pt states that she went out drinking Friday night and now recalls that she hit her head while she was out. She states that she woke up around 11am Saturday, vomiting. Pt was seen in the ED last night and was given fluids and zofran. Pt states that she took the zofran this am with no relief.

## 2023-10-02 NOTE — H&P ADULT - HISTORY OF PRESENT ILLNESS
Chief Complaint: vomiting.    · Chief Complaint: The patient is a 23y year old Female complaining of vomiting.  · HPI Objective Statement: 22 y/o female with PMHx of anxiety presents to the ED c/o persistent nausea/vomiting x2 days s/p heavy drinking 3 days ago. Patient states she woke up 11AM Saturday and at that point was vomiting every hour, unable to tolerate any PO intake since then; notes posterior headstrike 3 days ago while she was out. She was seen in ED last night and was found to be hypokalemic, given fluids and Zofran however without relief today. +Marijuana smoker, social drinker. Patient endorses experiencing similar symptoms at the beginning of the year, for which she was hospitalized with diagnosis of pancreatitis at that time.   Chief Complaint: vomiting.    The patient is a 23y year with significant PMH of anxiety, tobacco and marijuana abuse had been seen in [facility] here yesterday with c/o nausea and vomiting. She was found hypokalemia, was supplemented, and was discharged home from ED after hydration with IV fluid.  She says that she has persistent vomiting since Saturday morning, and she is not able to keep anything down to her stomach. She has not eaten anything for last 2 days due to persistent vomiting. She also has epigastric abdominal pain due to vomiting. Denies any hematemesis or hemoptysis. She smokes 10 cig daily and smokes marijuana daily. She also had binge drinking on Friday. She feels cold and chills. She denies chest pain, sob, headache, palpitation, diarrhea or dysuria. Couple days ago, she had posterior head strike, but CT head is negative for any acute intracranial abnormality, and denies headache,  Lipase level is normal. K level is 4.0, WBC count is 13.32, Hb 13.9, BUN 9.0, Cr 0.73, Mg 2.2.  CT head negative.    NS 2 L bolus, Zofran 4.00 mg IV x2 doses and Reglan 10 mg IV given in ED, and she still has every hour vomiting.

## 2023-10-02 NOTE — H&P ADULT - NSICDXPASTMEDICALHX_GEN_ALL_CORE_FT
PAST MEDICAL HISTORY:  Anxiety     Childhood asthma      PAST MEDICAL HISTORY:  Anxiety     Childhood asthma     Continuous tobacco abuse     Marijuana abuse

## 2023-10-02 NOTE — H&P ADULT - ASSESSMENT
The patient is a 23y year with significant PMH of anxiety, tobacco and marijuana abuse had been seen in [facility] here yesterday with c/o nausea and vomiting. She was found hypokalemia, was supplemented, and was discharged home from ED after hydration with IV fluid.  She says that she has persistent vomiting since Saturday morning, and she is not able to keep anything down to her stomach. She has not eaten anything for last 2 days due to persistent vomiting. She also has epigastric abdominal pain due to vomiting. Denies any hematemesis or hemoptysis. She smokes 10 cig daily and smokes marijuana daily. She also had binge drinking on Friday. She feels cold and chills. She denies chest pain, sob, headache, palpitation, diarrhea or dysuria. Couple days ago, she had posterior head strike, but CT head is negative for any acute intracranial abnormality, and denies headache,    # Intractable vomiting likely marijuana induced cyclical vomiting.  Lipase level normal.  Admit to medical floor.  IV fluid D5 ND at 150 ml/hour.  Keep NPO except meds and sips of water.  Zofran prn for N/V.    # Leukocytosis, likely reactive.  WBC count is 13.22k.  No urinary or respiratory symptoms.  Will get UA.  Repeat CBC in am.    # H/o Anxiety.  Continue her Escitalopram    # Tobacco and marijuana abuse.  Smoking cessation counseling.    # H/o Childhood asthma.  No active issue.  She does not use any inhalers.    # DVT prophylaxis: Lovenox sq daily.    Plan of care d/w the patient in detail at bedside, and she verbalized understanding.

## 2023-10-02 NOTE — ED STATDOCS - CLINICAL SUMMARY MEDICAL DECISION MAKING FREE TEXT BOX
24 y/o female presents to the ED for intractable vomiting in the setting of heavy drinking on Friday, hit her head at that time. Seen in the ED yesterday, was found to be hyperkalemic and was given fluids and discharged, however patient with persistent symptoms. Exam nonfocal. Will check electrolytes, symptom control, CT imaging to r/o intracranial process, reassess.

## 2023-10-02 NOTE — ED STATDOCS - NS ED MD DISPO ADMITTING SERVICE
Pt still with GI bleeding  Transfused 2u prbc's yesterday  spoke with Dr Keith  Awaiting transfer to [facility] - Pt with ?clogged seophageal sten    MEDICATIONS  (STANDING):  chlorhexidine 2% Cloths 1 Application(s) Topical <User Schedule>  dextrose 5% + sodium chloride 0.45%. 1000 milliLiter(s) (75 mL/Hr) IV Continuous <Continuous>  heparin   Injectable 5000 Unit(s) SubCutaneous every 12 hours  pantoprazole    Tablet 40 milliGRAM(s) Oral before breakfast  piperacillin/tazobactam IVPB.. 3.375 Gram(s) IV Intermittent every 12 hours  rifAXIMin 550 milliGRAM(s) Oral two times a day    MEDICATIONS  (PRN):  calcium carbonate    500 mG (Tums) Chewable 1 Tablet(s) Chew every 6 hours PRN Upset Stomach  morphine  - Injectable 2 milliGRAM(s) IV Push every 6 hours PRN Severe Pain (7 - 10)  sodium chloride 0.9% lock flush 10 milliLiter(s) IV Push every 1 hour PRN Pre/post blood products, medications, blood draw, and to maintain line patency      Allergies    Tylenol (Unknown)    Intolerances        Vital Signs Last 24 Hrs  T(C): 36.6 (14 Dec 2022 10:20), Max: 37.1 (13 Dec 2022 21:10)  T(F): 97.8 (14 Dec 2022 10:20), Max: 98.8 (13 Dec 2022 21:10)  HR: 98 (14 Dec 2022 10:20) (98 - 114)  BP: 131/75 (14 Dec 2022 10:20) (107/69 - 134/79)  BP(mean): --  RR: 18 (14 Dec 2022 10:20) (16 - 18)  SpO2: 100% (14 Dec 2022 10:20) (98% - 100%)    Parameters below as of 14 Dec 2022 10:20  Patient On (Oxygen Delivery Method): room air        PHYSICAL EXAM:  General: NAD.  CVS: S1, S2  Chest: air entry bilaterally present  Abd: BS present, soft, non-tender      LABS:                        8.9    12.25 )-----------( 188      ( 14 Dec 2022 06:22 )             27.5     12-14    146<H>  |  116<H>  |  67<H>  ----------------------------<  78  3.7   |  20<L>  |  5.42<H>    Ca    7.8<L>      14 Dec 2022 06:22    TPro  5.2<L>  /  Alb  1.5<L>  /  TBili  0.3  /  DBili  x   /  AST  13<L>  /  ALT  40  /  AlkPhos  223<H>  12-14        follow labs  awaiting transfer to [facility] (Dr Keith)     MED

## 2023-10-02 NOTE — ED STATDOCS - NS_ ATTENDINGSCRIBEDETAILS _ED_A_ED_FT
I, Mitchell Starkey MD,  performed the initial face to face bedside interview with this patient regarding history of present illness, review of symptoms and relevant past medical, social and family history.  I completed an independent physical examination.  I was the initial provider who evaluated this patient.   I personally saw the patient and performed a substantive portion of the visit including all aspects of the medical decision making.  The history, relevant review of systems, past medical and surgical history, medical decision making, and physical examination was documented by the scribe in my presence and I attest to the accuracy of the documentation.

## 2023-10-02 NOTE — ED STATDOCS - PROGRESS NOTE DETAILS
patient feeling improved, tolerating PO, potassium improved today.  Reviewed results, symptom management and return precautions -Amanda Umana PA-C Patient vomited, feels uncomfortable to go home, will try more medication, if not tolerating PO, will consider admission -Amanda Umana PA-C

## 2023-10-02 NOTE — ED STATDOCS - OBJECTIVE STATEMENT
24 y/o female with PMHx of anxiety presents to the ED c/o persistent nausea/vomiting x2 days s/p heavy drinking 3 days ago. Patient states she woke up 11AM Saturday and at that point was vomiting every hour, unable to tolerate any PO intake since then; notes posterior headstrike 3 days ago while she was out. She was seen in ED last night and was found to be hyperkalemic, given fluids and Zofran however without relief today. +Marijuana smoker, social drinker. Patient endorses experiencing similar symptoms at the beginning of the year, for which she was hospitalized with diagnosis of pancreatitis at that time. 22 y/o female with PMHx of anxiety presents to the ED c/o persistent nausea/vomiting x2 days s/p heavy drinking 3 days ago. Patient states she woke up 11AM Saturday and at that point was vomiting every hour, unable to tolerate any PO intake since then; notes posterior headstrike 3 days ago while she was out. She was seen in ED last night and was found to be hypokalemic, given fluids and Zofran however without relief today. +Marijuana smoker, social drinker. Patient endorses experiencing similar symptoms at the beginning of the year, for which she was hospitalized with diagnosis of pancreatitis at that time.

## 2023-10-02 NOTE — ED ADULT NURSE NOTE - OBJECTIVE STATEMENT
Pt returns to the ED for nausea and vomiting. Pt states that she went out drinking Friday night and now recalls that she hit her head while she was out. She states that she woke up around 11am Saturday, vomiting. Pt was seen in the ED last night and was given fluids and zofran. Pt states that she took the zofran this am with no relief.Pt admit to smoke marijuana every day.

## 2023-10-02 NOTE — ED STATDOCS - PHYSICAL EXAMINATION
Constitutional: NAD AAOx3  Eyes: PERRLA EOMI  Head: Normocephalic atraumatic  Mouth: MMM  Cardiac: regular rate   Resp: unlabored breathing  GI: Abd s/nt/nd  Neuro: grossly normal and intact  Skin: No visible rashes Constitutional: mild distress AAOx3  Eyes: PERRLA EOMI  Head: Normocephalic atraumatic  Mouth: MMM  Cardiac: regular rate   Resp: unlabored breathing clear b/l  GI: Abd s/nt/nd no rebound or guarding negative rey/mcburney  Neuro: grossly normal and intact cn2-12 intact normal strength sensation coordination NIH-0  Skin: No visible rashes

## 2023-10-02 NOTE — H&P ADULT - NSHPLABSRESULTS_GEN_ALL_CORE
LABS:                        13.9   13.22 )-----------( 339      ( 02 Oct 2023 13:13 )             40.1     10-02    138  |  105  |  9   ----------------------------<  98  4.0   |  29  |  0.73    Ca    9.7      02 Oct 2023 13:13  Mg     2.2     10-02    TPro  8.1  /  Alb  4.1  /  TBili  0.5  /  DBili  x   /  AST  19  /  ALT  28  /  AlkPhos  82  10-02                  < from: CT Head No Cont (10.02.23 @ 14:42) >    IMPRESSION:    No evidence of acute intracranial hemorrhage or midline shift.    --- End of Report ---    < end of copied text >

## 2023-10-02 NOTE — H&P ADULT - NSHPPHYSICALEXAM_GEN_ALL_CORE
PHYSICAL EXAM:  GENERAL: NAD, lying in bed comfortably  HEAD:  Atraumatic, Normocephalic  EYES: EOMI, PERRLA, conjunctiva and sclera clear  ENT: Moist mucous membranes  NECK: Supple, No JVD  CHEST/LUNG: Clear to auscultation bilaterally; No rales, rhonchi, wheezing, or rubs. Unlabored respirations  HEART: Regular rate and rhythm; No murmurs, rubs, or gallops  ABDOMEN: Bowel sounds present; Soft, Epigastric tenderness present. Nondistended. No hepatomegaly  EXTREMITIES:  2+ Peripheral Pulses, brisk capillary refill. No clubbing, cyanosis, or edema  NERVOUS SYSTEM:  Alert & Oriented X3, speech clear. No deficits   MSK: FROM all 4 extremities, full and equal strength  SKIN: No rashes or lesions

## 2023-10-02 NOTE — ED STATDOCS - ATTENDING APP SHARED VISIT CONTRIBUTION OF CARE
I, Mitchell Starkey MD, personally saw the patient with ACP.  I have personally performed a face to face diagnostic evaluation on this patient.  I have reviewed the ACP note and agree with the history, exam, and plan of care, except as noted.   The initial assessment was performed by myself and then the patient was handed off to the ACP. The patient was followed and re-evaluated by the ACP. All labs, imaging and procedures were evaluated and performed by the ACP and I was available for consultation if any questions in the patients care came up.

## 2023-10-02 NOTE — ED STATDOCS - NS ED ROS FT
Constitutional: No fever or chills  Eyes: No visual changes  HEENT: No throat pain  CV: No chest pain  Resp: No SOB no cough  GI: +Nausea, vomiting   : No dysuria  MSK: No musculoskeletal pain  Skin: No rash  Neuro: No headache

## 2023-10-03 LAB
ANION GAP SERPL CALC-SCNC: 6 MMOL/L — SIGNIFICANT CHANGE UP (ref 5–17)
APPEARANCE UR: CLEAR — SIGNIFICANT CHANGE UP
BACTERIA # UR AUTO: ABNORMAL
BILIRUB UR-MCNC: NEGATIVE — SIGNIFICANT CHANGE UP
BUN SERPL-MCNC: 6 MG/DL — LOW (ref 7–23)
CALCIUM SERPL-MCNC: 9 MG/DL — SIGNIFICANT CHANGE UP (ref 8.5–10.1)
CHLORIDE SERPL-SCNC: 107 MMOL/L — SIGNIFICANT CHANGE UP (ref 96–108)
CO2 SERPL-SCNC: 27 MMOL/L — SIGNIFICANT CHANGE UP (ref 22–31)
COLOR SPEC: YELLOW — SIGNIFICANT CHANGE UP
COMMENT - URINE: SIGNIFICANT CHANGE UP
CREAT SERPL-MCNC: 0.72 MG/DL — SIGNIFICANT CHANGE UP (ref 0.5–1.3)
DIFF PNL FLD: NEGATIVE — SIGNIFICANT CHANGE UP
EGFR: 120 ML/MIN/1.73M2 — SIGNIFICANT CHANGE UP
EPI CELLS # UR: ABNORMAL
GLUCOSE SERPL-MCNC: 122 MG/DL — HIGH (ref 70–99)
GLUCOSE UR QL: NEGATIVE — SIGNIFICANT CHANGE UP
HCT VFR BLD CALC: 39.2 % — SIGNIFICANT CHANGE UP (ref 34.5–45)
HGB BLD-MCNC: 13.3 G/DL — SIGNIFICANT CHANGE UP (ref 11.5–15.5)
KETONES UR-MCNC: ABNORMAL
LEUKOCYTE ESTERASE UR-ACNC: ABNORMAL
MCHC RBC-ENTMCNC: 30 PG — SIGNIFICANT CHANGE UP (ref 27–34)
MCHC RBC-ENTMCNC: 33.9 GM/DL — SIGNIFICANT CHANGE UP (ref 32–36)
MCV RBC AUTO: 88.3 FL — SIGNIFICANT CHANGE UP (ref 80–100)
NITRITE UR-MCNC: NEGATIVE — SIGNIFICANT CHANGE UP
PH UR: 8 — SIGNIFICANT CHANGE UP (ref 5–8)
PLATELET # BLD AUTO: 308 K/UL — SIGNIFICANT CHANGE UP (ref 150–400)
POTASSIUM SERPL-MCNC: 3.2 MMOL/L — LOW (ref 3.5–5.3)
POTASSIUM SERPL-SCNC: 3.2 MMOL/L — LOW (ref 3.5–5.3)
PROT UR-MCNC: NEGATIVE — SIGNIFICANT CHANGE UP
RBC # BLD: 4.44 M/UL — SIGNIFICANT CHANGE UP (ref 3.8–5.2)
RBC # FLD: 14.3 % — SIGNIFICANT CHANGE UP (ref 10.3–14.5)
RBC CASTS # UR COMP ASSIST: NEGATIVE /HPF — SIGNIFICANT CHANGE UP (ref 0–4)
SODIUM SERPL-SCNC: 140 MMOL/L — SIGNIFICANT CHANGE UP (ref 135–145)
SP GR SPEC: 1.01 — SIGNIFICANT CHANGE UP (ref 1.01–1.02)
UROBILINOGEN FLD QL: NEGATIVE — SIGNIFICANT CHANGE UP
WBC # BLD: 8.22 K/UL — SIGNIFICANT CHANGE UP (ref 3.8–10.5)
WBC # FLD AUTO: 8.22 K/UL — SIGNIFICANT CHANGE UP (ref 3.8–10.5)
WBC UR QL: SIGNIFICANT CHANGE UP /HPF (ref 0–5)

## 2023-10-03 PROCEDURE — 99232 SBSQ HOSP IP/OBS MODERATE 35: CPT

## 2023-10-03 RX ORDER — ONDANSETRON 8 MG/1
8 TABLET, FILM COATED ORAL EVERY 8 HOURS
Refills: 0 | Status: COMPLETED | OUTPATIENT
Start: 2023-10-03 | End: 2023-10-04

## 2023-10-03 RX ORDER — DIPHENHYDRAMINE HCL 50 MG
25 CAPSULE ORAL EVERY 8 HOURS
Refills: 0 | Status: DISCONTINUED | OUTPATIENT
Start: 2023-10-03 | End: 2023-10-06

## 2023-10-03 RX ORDER — ONDANSETRON 8 MG/1
8 TABLET, FILM COATED ORAL EVERY 8 HOURS
Refills: 0 | Status: DISCONTINUED | OUTPATIENT
Start: 2023-10-04 | End: 2023-10-04

## 2023-10-03 RX ORDER — VALACYCLOVIR 500 MG/1
1 TABLET, FILM COATED ORAL
Refills: 0 | DISCHARGE

## 2023-10-03 RX ORDER — DEXTROSE MONOHYDRATE, SODIUM CHLORIDE, AND POTASSIUM CHLORIDE 50; .745; 4.5 G/1000ML; G/1000ML; G/1000ML
1000 INJECTION, SOLUTION INTRAVENOUS
Refills: 0 | Status: DISCONTINUED | OUTPATIENT
Start: 2023-10-03 | End: 2023-10-06

## 2023-10-03 RX ADMIN — ONDANSETRON 4 MILLIGRAM(S): 8 TABLET, FILM COATED ORAL at 06:51

## 2023-10-03 RX ADMIN — Medication 0.5 MILLIGRAM(S): at 20:10

## 2023-10-03 RX ADMIN — Medication 0.5 MILLIGRAM(S): at 11:20

## 2023-10-03 RX ADMIN — SODIUM CHLORIDE 150 MILLILITER(S): 9 INJECTION, SOLUTION INTRAVENOUS at 05:12

## 2023-10-03 RX ADMIN — ESCITALOPRAM OXALATE 20 MILLIGRAM(S): 10 TABLET, FILM COATED ORAL at 21:44

## 2023-10-03 RX ADMIN — ONDANSETRON 8 MILLIGRAM(S): 8 TABLET, FILM COATED ORAL at 21:44

## 2023-10-03 RX ADMIN — ONDANSETRON 8 MILLIGRAM(S): 8 TABLET, FILM COATED ORAL at 15:12

## 2023-10-03 RX ADMIN — DEXTROSE MONOHYDRATE, SODIUM CHLORIDE, AND POTASSIUM CHLORIDE 75 MILLILITER(S): 50; .745; 4.5 INJECTION, SOLUTION INTRAVENOUS at 11:21

## 2023-10-03 RX ADMIN — Medication 25 MILLIGRAM(S): at 11:22

## 2023-10-03 RX ADMIN — Medication 25 MILLIGRAM(S): at 20:10

## 2023-10-03 NOTE — SBIRT NOTE ADULT - NSSBIRTBRIEFINTDET_GEN_A_CORE
Patient denies concerns and decline treatment resources; reports she won't drink again due to the symptoms

## 2023-10-03 NOTE — PATIENT PROFILE ADULT - FALL HARM RISK - RISK INTERVENTIONS

## 2023-10-03 NOTE — PROGRESS NOTE ADULT - ASSESSMENT
Pt is a 22 y/o with significant PMH of anxiety, tobacco and marijuana abuse had been seen in ED here yesterday with c/o nausea and vomiting. She was found hypokalemia, was supplemented, and was discharged home from ED after hydration with IV fluid.  She says that she has persistent vomiting since Saturday morning, and she is not able to keep anything down to her stomach    #Nausea and Vomiting   #Cyclic vomiting likely 2/2 marijuana use   - continue zofran   - continue IV fluids   - start benadryl and lorazepam   - start clear liquid diet   - monitor electrolytes     #Hypokalemia  - 3 --> now 3.2  - monitor and replete       #Leukocytosis   - 12.72 --> now 8.22  - monitor     #Anxiety   - continue escitalopram     #VTE PPX  - lovenox

## 2023-10-03 NOTE — PROGRESS NOTE ADULT - ATTENDING COMMENTS
Patient seen and examined with PA cale Crook.  I was physically present for the key portions of the evaluation and management (E/M) service provided.  I agree with the above history, physical, and plan which I have reviewed and edited where appropriate.   - pt urged to stop smoking marijuana  - continue zofran/benadryl/ativan  - start clears and advance diet in AM if tolerating

## 2023-10-03 NOTE — DIETITIAN INITIAL EVALUATION ADULT - NSICDXPASTMEDICALHX_GEN_ALL_CORE_FT
PAST MEDICAL HISTORY:  Anxiety     Childhood asthma     Continuous tobacco abuse     Marijuana abuse

## 2023-10-03 NOTE — DIETITIAN INITIAL EVALUATION ADULT - PERTINENT LABORATORY DATA
10-03    140  |  107  |  6<L>  ----------------------------<  122<H>  3.2<L>   |  27  |  0.72    Ca    9.0      03 Oct 2023 06:22  Mg     2.2     10-02    TPro  8.1  /  Alb  4.1  /  TBili  0.5  /  DBili  x   /  AST  19  /  ALT  28  /  AlkPhos  82  10-02

## 2023-10-03 NOTE — DIETITIAN INITIAL EVALUATION ADULT - ADD RECOMMEND
1) Advance diet to Regular as soon as medically feasible  - consider anti-emetics with meals and snacks  2) Obtain vitamin D 25OH level to assess nutriture  3) Please obtain weekly weights   4) MVI w/ minerals daily to ensure 100% RDA met  5) Monitor and replete lytes PRN   6) Encourage protein-rich foods, maximize food preferences  7) Monitor bowel movements, if no BM for >3 days, consider implementing bowel regimen.  8) Confirm goals of care regarding nutrition support  - If pt's diet cannot be advanced within the next 48-72 hours, consider initiating PN  RD will continue to monitor PO intake, labs, hydration, and wt prn.

## 2023-10-03 NOTE — DIETITIAN INITIAL EVALUATION ADULT - PERTINENT MEDS FT
MEDICATIONS  (STANDING):  dextrose 5% + sodium chloride 0.9% with potassium chloride 20 mEq/L 1000 milliLiter(s) (75 mL/Hr) IV Continuous <Continuous>  enoxaparin Injectable 40 milliGRAM(s) SubCutaneous every 24 hours  escitalopram 20 milliGRAM(s) Oral at bedtime  ondansetron Injectable 8 milliGRAM(s) IV Push every 8 hours    MEDICATIONS  (PRN):  acetaminophen     Tablet .. 650 milliGRAM(s) Oral every 6 hours PRN Mild Pain (1 - 3)  aluminum hydroxide/magnesium hydroxide/simethicone Suspension 30 milliLiter(s) Oral every 4 hours PRN Dyspepsia  diphenhydrAMINE Injectable 25 milliGRAM(s) IV Push every 8 hours PRN Nausea/Vomiting (With lorazepam)  LORazepam   Injectable 0.5 milliGRAM(s) IV Push every 8 hours PRN Nausea and/or Vomiting (With diphenhydramine)  melatonin 3 milliGRAM(s) Oral at bedtime PRN Insomnia    Home Medications:  cyclobenzaprine 10 mg oral tablet: 1 tab(s) orally 1 to 3 times a day as needed for  muscle spasm (02 Oct 2023 22:45)  escitalopram 20 mg oral tablet: 1 tab(s) orally once a day (at bedtime) (02 Oct 2023 22:44)  valACYclovir 1 g oral tablet: 1 tab(s) orally once a day (02 Oct 2023 22:45)

## 2023-10-03 NOTE — DIETITIAN INITIAL EVALUATION ADULT - ORAL INTAKE PTA/DIET HISTORY
Reports a very poor appetite/ intake since 11am Saturday morning 2/2 N/V; intake <50% of ENN x 3 days. Normally has a very good appetite/ intake and does not follow any diet restrictions. Does not use any ONS at home. Pt reports that she was hospitalized in January with a very similar episode, however it resolved on its own after 4 days with fluids.

## 2023-10-03 NOTE — DIETITIAN INITIAL EVALUATION ADULT - OTHER INFO
24 y/o F with a PMHx of anxiety, tobacco and marijuana abuse had been seen in [facility] here yesterday with c/o nausea and vomiting. She was found hypokalemia, was supplemented, and was discharged home from ED after hydration with IV fluid. She says that she has persistent vomiting since Saturday morning, and she is not able to keep anything down to her stomach. She has not eaten anything for last 2 days due to persistent vomiting. She also has epigastric abdominal pain due to vomiting. She smokes 10 cig daily and smokes marijuana daily. She also had binge drinking on Friday. She feels cold and chills. Couple days ago, she had posterior head strike, but CT head is negative for any acute intracranial abnormality. Admitted for intractable vomiting likely marijuana induced cyclical vomiting.    Visited pt this morning, states that at time of visit she was not experiencing any N/V, however was vomiting about an hour and a half prior to visit. Reports that she will be trialed on Reglan today to see if that helps with her N/V. States that her UBW is ~130#, but believes that she has lost a little bit of weight over the last few days. RD obtained bedscale wt on 10/3 - 129#. No weight hx to review. Weight loss of 1# (0.8%) x 3 days - not clinsig. NFPE reveals no muscle/ fat wasting, pt does not meet criteria for PCM at this time, however is at HIGH RISK for becoming malnourished. Recommend to advance diet to Low Fat as soon as medically feasible 2/2 N/V; if pt can tolerate diet, pt can be liberalized to Regular diet. No need for ONS at this time, however can trial ONS if pt has poor po intake. Pt asking to trial chicken broth, currently NPO; recommend to trial CLD. If pt's diet cannot be advanced within the next 48-72 hours, recommend to consider PN. Please see additional recommendations below.

## 2023-10-04 LAB
ANION GAP SERPL CALC-SCNC: 6 MMOL/L — SIGNIFICANT CHANGE UP (ref 5–17)
BUN SERPL-MCNC: 2 MG/DL — LOW (ref 7–23)
CALCIUM SERPL-MCNC: 8.6 MG/DL — SIGNIFICANT CHANGE UP (ref 8.5–10.1)
CHLORIDE SERPL-SCNC: 108 MMOL/L — SIGNIFICANT CHANGE UP (ref 96–108)
CO2 SERPL-SCNC: 27 MMOL/L — SIGNIFICANT CHANGE UP (ref 22–31)
CREAT SERPL-MCNC: 0.8 MG/DL — SIGNIFICANT CHANGE UP (ref 0.5–1.3)
EGFR: 105 ML/MIN/1.73M2 — SIGNIFICANT CHANGE UP
GLUCOSE SERPL-MCNC: 116 MG/DL — HIGH (ref 70–99)
POTASSIUM SERPL-MCNC: 3.3 MMOL/L — LOW (ref 3.5–5.3)
POTASSIUM SERPL-SCNC: 3.3 MMOL/L — LOW (ref 3.5–5.3)
SODIUM SERPL-SCNC: 141 MMOL/L — SIGNIFICANT CHANGE UP (ref 135–145)

## 2023-10-04 PROCEDURE — 99232 SBSQ HOSP IP/OBS MODERATE 35: CPT

## 2023-10-04 RX ORDER — METOCLOPRAMIDE HCL 10 MG
10 TABLET ORAL EVERY 6 HOURS
Refills: 0 | Status: DISCONTINUED | OUTPATIENT
Start: 2023-10-04 | End: 2023-10-06

## 2023-10-04 RX ADMIN — Medication 0.5 MILLIGRAM(S): at 21:33

## 2023-10-04 RX ADMIN — Medication 650 MILLIGRAM(S): at 09:46

## 2023-10-04 RX ADMIN — DEXTROSE MONOHYDRATE, SODIUM CHLORIDE, AND POTASSIUM CHLORIDE 75 MILLILITER(S): 50; .745; 4.5 INJECTION, SOLUTION INTRAVENOUS at 00:49

## 2023-10-04 RX ADMIN — Medication 10 MILLIGRAM(S): at 11:19

## 2023-10-04 RX ADMIN — DEXTROSE MONOHYDRATE, SODIUM CHLORIDE, AND POTASSIUM CHLORIDE 75 MILLILITER(S): 50; .745; 4.5 INJECTION, SOLUTION INTRAVENOUS at 17:20

## 2023-10-04 RX ADMIN — Medication 25 MILLIGRAM(S): at 21:33

## 2023-10-04 RX ADMIN — ONDANSETRON 8 MILLIGRAM(S): 8 TABLET, FILM COATED ORAL at 06:36

## 2023-10-04 RX ADMIN — Medication 10 MILLIGRAM(S): at 17:20

## 2023-10-04 RX ADMIN — ENOXAPARIN SODIUM 40 MILLIGRAM(S): 100 INJECTION SUBCUTANEOUS at 11:19

## 2023-10-04 RX ADMIN — ESCITALOPRAM OXALATE 20 MILLIGRAM(S): 10 TABLET, FILM COATED ORAL at 21:33

## 2023-10-04 RX ADMIN — Medication 650 MILLIGRAM(S): at 10:00

## 2023-10-04 NOTE — PROGRESS NOTE ADULT - ASSESSMENT
Pt is a 24 y/o with significant PMH of anxiety, tobacco and marijuana abuse had been seen in ED here yesterday with c/o nausea and vomiting. She was found hypokalemia, was supplemented, and was discharged home from ED after hydration with IV fluid.  She says that she has persistent vomiting since Saturday morning, and she is not able to keep anything down to her stomach    #Nausea and Vomiting   #Cyclic vomiting likely 2/2 marijuana use   - cahnge to reglan as zofran not effective   - continue IV fluids   - start benadryl and lorazepam   - start clear liquid diet   - monitor electrolytes     #Hypokalemia  - 3 --> now 3.2  - monitor and replete       #Leukocytosis   - 12.72 --> now 8.22  - monitor     #Anxiety   - continue escitalopram     #VTE PPX  - lovenox

## 2023-10-05 ENCOUNTER — TRANSCRIPTION ENCOUNTER (OUTPATIENT)
Age: 23
End: 2023-10-05

## 2023-10-05 LAB
ADD ON TEST-SPECIMEN IN LAB: SIGNIFICANT CHANGE UP
ANION GAP SERPL CALC-SCNC: 3 MMOL/L — LOW (ref 5–17)
BUN SERPL-MCNC: 5 MG/DL — LOW (ref 7–23)
CALCIUM SERPL-MCNC: 8.5 MG/DL — SIGNIFICANT CHANGE UP (ref 8.5–10.1)
CHLORIDE SERPL-SCNC: 109 MMOL/L — HIGH (ref 96–108)
CO2 SERPL-SCNC: 27 MMOL/L — SIGNIFICANT CHANGE UP (ref 22–31)
CREAT SERPL-MCNC: 0.71 MG/DL — SIGNIFICANT CHANGE UP (ref 0.5–1.3)
EGFR: 122 ML/MIN/1.73M2 — SIGNIFICANT CHANGE UP
GLUCOSE SERPL-MCNC: 96 MG/DL — SIGNIFICANT CHANGE UP (ref 70–99)
MAGNESIUM SERPL-MCNC: 1.9 MG/DL — SIGNIFICANT CHANGE UP (ref 1.6–2.6)
POTASSIUM SERPL-MCNC: 3.4 MMOL/L — LOW (ref 3.5–5.3)
POTASSIUM SERPL-SCNC: 3.4 MMOL/L — LOW (ref 3.5–5.3)
SODIUM SERPL-SCNC: 139 MMOL/L — SIGNIFICANT CHANGE UP (ref 135–145)

## 2023-10-05 PROCEDURE — 74177 CT ABD & PELVIS W/CONTRAST: CPT | Mod: 26

## 2023-10-05 PROCEDURE — 99232 SBSQ HOSP IP/OBS MODERATE 35: CPT

## 2023-10-05 RX ORDER — POTASSIUM CHLORIDE 20 MEQ
40 PACKET (EA) ORAL EVERY 4 HOURS
Refills: 0 | Status: COMPLETED | OUTPATIENT
Start: 2023-10-05 | End: 2023-10-05

## 2023-10-05 RX ORDER — CYCLOBENZAPRINE HYDROCHLORIDE 10 MG/1
1 TABLET, FILM COATED ORAL
Refills: 0 | DISCHARGE

## 2023-10-05 RX ORDER — DIPHENHYDRAMINE HCL 50 MG
1 CAPSULE ORAL
Qty: 30 | Refills: 0
Start: 2023-10-05 | End: 2023-10-14

## 2023-10-05 RX ORDER — METOCLOPRAMIDE HCL 10 MG
1 TABLET ORAL
Qty: 30 | Refills: 0
Start: 2023-10-05 | End: 2023-10-14

## 2023-10-05 RX ADMIN — Medication 20 MILLIGRAM(S): at 18:23

## 2023-10-05 RX ADMIN — Medication 25 MILLIGRAM(S): at 19:58

## 2023-10-05 RX ADMIN — Medication 10 MILLIGRAM(S): at 12:44

## 2023-10-05 RX ADMIN — DEXTROSE MONOHYDRATE, SODIUM CHLORIDE, AND POTASSIUM CHLORIDE 75 MILLILITER(S): 50; .745; 4.5 INJECTION, SOLUTION INTRAVENOUS at 06:57

## 2023-10-05 RX ADMIN — ESCITALOPRAM OXALATE 20 MILLIGRAM(S): 10 TABLET, FILM COATED ORAL at 20:12

## 2023-10-05 RX ADMIN — Medication 0.5 MILLIGRAM(S): at 19:57

## 2023-10-05 RX ADMIN — Medication 40 MILLIEQUIVALENT(S): at 15:54

## 2023-10-05 RX ADMIN — DEXTROSE MONOHYDRATE, SODIUM CHLORIDE, AND POTASSIUM CHLORIDE 75 MILLILITER(S): 50; .745; 4.5 INJECTION, SOLUTION INTRAVENOUS at 12:44

## 2023-10-05 RX ADMIN — Medication 10 MILLIGRAM(S): at 05:07

## 2023-10-05 RX ADMIN — Medication 10 MILLIGRAM(S): at 18:07

## 2023-10-05 RX ADMIN — DEXTROSE MONOHYDRATE, SODIUM CHLORIDE, AND POTASSIUM CHLORIDE 75 MILLILITER(S): 50; .745; 4.5 INJECTION, SOLUTION INTRAVENOUS at 15:55

## 2023-10-05 NOTE — DISCHARGE NOTE PROVIDER - NSDCCPCAREPLAN_GEN_ALL_CORE_FT
PRINCIPAL DISCHARGE DIAGNOSIS  Diagnosis: Vomiting  Assessment and Plan of Treatment: use meds as directed   return to ER if unable to eat       PRINCIPAL DISCHARGE DIAGNOSIS  Diagnosis: Vomiting  Assessment and Plan of Treatment: use meds as directed   return to ER if unable to eat  continue with reglan, bentyl  Follow up with your gastroenterologist

## 2023-10-05 NOTE — DISCHARGE NOTE PROVIDER - NSDCMRMEDTOKEN_GEN_ALL_CORE_FT
Ativan 0.5 mg oral tablet: 1 tab(s) orally every 8 hours as needed for  nausea MDD: 3 tabs  diphenhydrAMINE 50 mg oral tablet: 1 tab(s) orally 3 times a day as needed for  nausea  escitalopram 20 mg oral tablet: 1 tab(s) orally once a day (at bedtime)  metoclopramide 10 mg oral tablet: 1 tab(s) orally 3 times a day  valACYclovir 1 g oral tablet: 1 tab(s) orally once a day FOR COLD SORES   Ativan 0.5 mg oral tablet: 1 tab(s) orally every 8 hours as needed for  nausea MDD: 3 tabs  dicyclomine 20 mg oral tablet: 1 tab(s) orally 3 times a day (before meals)  diphenhydrAMINE 50 mg oral tablet: 1 tab(s) orally 3 times a day as needed for  nausea  escitalopram 20 mg oral tablet: 1 tab(s) orally once a day (at bedtime)  metoclopramide 10 mg oral tablet: 1 tab(s) orally 3 times a day  valACYclovir 1 g oral tablet: 1 tab(s) orally once a day FOR COLD SORES

## 2023-10-05 NOTE — DISCHARGE NOTE PROVIDER - CARE PROVIDER_API CALL
TROY ZHENG  [address] [place] [address]  Phone: [phone]  Fax: [phone]  Follow Up Time:    TROY ZHENG  [address] [place] [address]  Phone: [phone]  Fax: [phone]  Follow Up Time:     KERVIN JUAREZ, Phys,    Phone: [phone]  Fax: [phone]  Follow Up Time:

## 2023-10-05 NOTE — DISCHARGE NOTE PROVIDER - PROVIDER TOKENS
PROVIDER:[TOKEN:[[id number]]] PROVIDER:[TOKEN:[[id number]]],PROVIDER:[TOKEN:[[id number]]]

## 2023-10-05 NOTE — DISCHARGE NOTE PROVIDER - HOSPITAL COURSE
The patient is a 23y year with significant PMH of anxiety, tobacco and marijuana abuse had been seen in [facility] here yesterday with c/o nausea and vomiting. She was found hypokalemia, was supplemented, and was discharged home from ED after hydration with IV fluid.  She says that she has persistent vomiting since Saturday morning, and she is not able to keep anything down to her stomach. She has not eaten anything for last 2 days due to persistent vomiting. She also has epigastric abdominal pain due to vomiting. Denies any hematemesis or hemoptysis. She smokes 10 cig daily and smokes marijuana daily. She also had binge drinking on Friday. She feels cold and chills. She denies chest pain, sob, headache, palpitation, diarrhea or dysuria. Couple days ago, she had posterior head strike, but CT head is negative for any acute intracranial abnormality, and denies headache,  Lipase level is normal. K level is 4.0, WBC count is 13.32, Hb 13.9, BUN 9.0, Cr 0.73, Mg 2.2.  CT head negative.    NS 2 L bolus, Zofran 4.00 mg IV x2 doses and Reglan 10 mg IV given in ED, and she still has every hour vomiting.    (02 Oct 2023 22:24)    pt admitted and treated with zofran which was not helping and changed to reglan.  continue benadryl and ativan prn. pt for dc home if tolerates lunch.      GEN: NAD, comfortable, resting in bed  HEENT: NC/AT, normal hearing, marcela dentition   NECK: supple, no JVD, no LAD, no thyromegaly  BACK:  ROM intact, no spinal/paraspinal tenderness  CV: S1S2, RRR, no murmur  RESP: good air movement, CTABL, no rales, rhonchi or wheezing, respirations unlabored  ABD: +BS, soft, ND, NT, no guarding, no rigidity, no HSM  EXT: +2 radial and pedal pulses, no edema, no calve tenderness  SKIN: No visible Rashes or Ulcers  MSK: ROM intact in all extremities  NEURO: sensory intact, no motor deficits, AOx3  PSYCH: normal behavior     RADIOLOGY:  < from: CT Head No Cont (10.02.23 @ 14:42) >  IMPRESSION:    No evidence of acute intracranial hemorrhage or midline shift.      #Nausea and Vomiting   #Cyclic vomiting likely 2/2 marijuana use   - continue reglan, benadryl and lorazepam   - tolerating liquids. advance as tolerated     #Hypokalemia 2/2 vomiting   - repleted     #Leukocytosis - reactive vs self limited viral illness that may have contributed to vomiting.   - resolved.     #Anxiety   - continue escitalopram     #VTE PPX  - lovenox       time spent 40 mins 23y year with significant PMH of anxiety, tobacco and marijuana abuse had been seen in [facility] here yesterday with c/o nausea and vomiting. She was found hypokalemia, was supplemented, and was discharged home from ED after hydration with IV fluid.  She says that she has persistent vomiting since Saturday morning, and she is not able to keep anything down to her stomach. She has not eaten anything for last 2 days due to persistent vomiting. She also has epigastric abdominal pain due to vomiting. Denies any hematemesis or hemoptysis. She smokes 10 cig daily and smokes marijuana daily. She also had binge drinking on Friday. She feels cold and chills. She denies chest pain, sob, headache, palpitation, diarrhea or dysuria. Couple days ago, she had posterior head strike, but CT head is negative for any acute intracranial abnormality, and denies headache,  Lipase level is normal. K level is 4.0, WBC count is 13.32, Hb 13.9, BUN 9.0, Cr 0.73, Mg 2.2. CT head negative. NS 2 L bolus, Zofran 4.00 mg IV x2 doses and Reglan 10 mg IV given in ED, and she still has every hour vomiting.      pt admitted and treated with zofran which was not helping and changed to reglan with improvement.  continue benadryl and ativan prn.     GEN: NAD, comfortable, resting in bed  HEENT: NC/AT, normal hearing, marcela dentition   NECK: supple, no JVD, no LAD, no thyromegaly  BACK:  ROM intact, no spinal/paraspinal tenderness  CV: S1S2, RRR, no murmur  RESP: good air movement, CTABL, no rales, rhonchi or wheezing, respirations unlabored  ABD: +BS, soft, ND, NT, no guarding, no rigidity, no HSM  EXT: +2 radial and pedal pulses, no edema, no calve tenderness  SKIN: No visible Rashes or Ulcers  MSK: ROM intact in all extremities  NEURO: sensory intact, no motor deficits, AOx3  PSYCH: normal behavior     RADIOLOGY:  < from: CT Head No Cont (10.02.23 @ 14:42) >  IMPRESSION:    No evidence of acute intracranial hemorrhage or midline shift.      #Nausea and Vomiting   #Cyclic vomiting likely 2/2 marijuana use   - continue reglan, benadryl and lorazepam   - tolerating regular diet   -f/u with out patient GI Dr. Brannon at [place]     #Hypokalemia 2/2 vomiting   - repleted     #Leukocytosis - reactive vs self limited viral illness that may have contributed to vomiting.   - resolved.     #Anxiety   - continue escitalopram     #VTE PPX  - lovenox

## 2023-10-06 ENCOUNTER — TRANSCRIPTION ENCOUNTER (OUTPATIENT)
Age: 23
End: 2023-10-06

## 2023-10-06 VITALS
TEMPERATURE: 100 F | SYSTOLIC BLOOD PRESSURE: 129 MMHG | DIASTOLIC BLOOD PRESSURE: 65 MMHG | HEART RATE: 62 BPM | OXYGEN SATURATION: 100 % | RESPIRATION RATE: 18 BRPM

## 2023-10-06 LAB
ANION GAP SERPL CALC-SCNC: 3 MMOL/L — LOW (ref 5–17)
BUN SERPL-MCNC: 5 MG/DL — LOW (ref 7–23)
CALCIUM SERPL-MCNC: 9 MG/DL — SIGNIFICANT CHANGE UP (ref 8.5–10.1)
CHLORIDE SERPL-SCNC: 105 MMOL/L — SIGNIFICANT CHANGE UP (ref 96–108)
CO2 SERPL-SCNC: 28 MMOL/L — SIGNIFICANT CHANGE UP (ref 22–31)
CREAT SERPL-MCNC: 0.76 MG/DL — SIGNIFICANT CHANGE UP (ref 0.5–1.3)
EGFR: 112 ML/MIN/1.73M2 — SIGNIFICANT CHANGE UP
GLUCOSE SERPL-MCNC: 121 MG/DL — HIGH (ref 70–99)
POTASSIUM SERPL-MCNC: 3.7 MMOL/L — SIGNIFICANT CHANGE UP (ref 3.5–5.3)
POTASSIUM SERPL-SCNC: 3.7 MMOL/L — SIGNIFICANT CHANGE UP (ref 3.5–5.3)
SODIUM SERPL-SCNC: 136 MMOL/L — SIGNIFICANT CHANGE UP (ref 135–145)

## 2023-10-06 PROCEDURE — 99238 HOSP IP/OBS DSCHRG MGMT 30/<: CPT

## 2023-10-06 RX ADMIN — Medication 10 MILLIGRAM(S): at 06:01

## 2023-10-06 RX ADMIN — Medication 10 MILLIGRAM(S): at 00:24

## 2023-10-06 RX ADMIN — DEXTROSE MONOHYDRATE, SODIUM CHLORIDE, AND POTASSIUM CHLORIDE 75 MILLILITER(S): 50; .745; 4.5 INJECTION, SOLUTION INTRAVENOUS at 00:26

## 2023-10-06 NOTE — DISCHARGE NOTE NURSING/CASE MANAGEMENT/SOCIAL WORK - NSDCPEWEB_GEN_ALL_CORE
[facility] for Tobacco Control website --- [email or web address] --- [email or web address]

## 2023-10-06 NOTE — PROGRESS NOTE ADULT - SUBJECTIVE AND OBJECTIVE BOX
The patient is a 23y year with significant PMH of anxiety, tobacco and marijuana abuse had been seen in [facility] here yesterday with c/o nausea and vomiting. She was found hypokalemia, was supplemented, and was discharged home from ED after hydration with IV fluid.  She says that she has persistent vomiting since Saturday morning, and she is not able to keep anything down to her stomach. She has not eaten anything for last 2 days due to persistent vomiting. She also has epigastric abdominal pain due to vomiting. Denies any hematemesis or hemoptysis. She smokes 10 cig daily and smokes marijuana daily. She also had binge drinking on Friday. She feels cold and chills. She denies chest pain, sob, headache, palpitation, diarrhea or dysuria. Couple days ago, she had posterior head strike, but CT head is negative for any acute intracranial abnormality, and denies headache,  Lipase level is normal. K level is 4.0, WBC count is 13.32, Hb 13.9, BUN 9.0, Cr 0.73, Mg 2.2.  CT head negative.    NS 2 L bolus, Zofran 4.00 mg IV x2 doses and Reglan 10 mg IV given in ED, and she still has every hour vomiting.    (02 Oct 2023 22:24)    10/5 - persistent vomtiing today. awaiting CT scan    ROS:   All 10 systems reviewed and found to be negative with the exception of what has been described above.    Vital Signs Last 24 Hrs  T(C): 37.2 (05 Oct 2023 08:55), Max: 37.2 (05 Oct 2023 08:55)  T(F): 99 (05 Oct 2023 08:55), Max: 99 (05 Oct 2023 08:55)  HR: 57 (05 Oct 2023 08:55) (57 - 77)  BP: 118/68 (05 Oct 2023 08:55) (98/64 - 122/59)  BP(mean): 84 (05 Oct 2023 08:55) (84 - 84)  RR: 18 (05 Oct 2023 08:55) (17 - 18)  SpO2: 100% (05 Oct 2023 08:55) (98% - 100%)    Parameters below as of 05 Oct 2023 08:55  Patient On (Oxygen Delivery Method): room air        GEN: NAD, comfortable, resting in bed  HEENT: NC/AT, normal hearing, marcela dentition   NECK: supple, no JVD, no LAD, no thyromegaly  BACK:  ROM intact, no spinal/paraspinal tenderness  CV: S1S2, RRR, no murmur  RESP: good air movement, CTABL, no rales, rhonchi or wheezing, respirations unlabored  ABD: +BS, soft, ND, NT, no guarding, no rigidity, no HSM  EXT: +2 radial and pedal pulses, no edema, no calve tenderness  SKIN: No visible Rashes or Ulcers  MSK: ROM intact in all extremities  NEURO: sensory intact, no motor deficits, AOx3  PSYCH: normal behavior             10-05    139  |  109<H>  |  5<L>  ----------------------------<  96  3.4<L>   |  27  |  0.71    Ca    8.5      05 Oct 2023 06:30  Mg     1.9     10-05              Urinalysis Basic - ( 05 Oct 2023 06:30 )    Color: x / Appearance: x / SG: x / pH: x  Gluc: 96 mg/dL / Ketone: x  / Bili: x / Urobili: x   Blood: x / Protein: x / Nitrite: x   Leuk Esterase: x / RBC: x / WBC x   Sq Epi: x / Non Sq Epi: x / Bacteria: x        RADIOLOGY:  < from: CT Head No Cont (10.02.23 @ 14:42) >  IMPRESSION:    No evidence of acute intracranial hemorrhage or midline shift.      #Nausea and Vomiting   #Cyclic vomiting likely 2/2 marijuana use   - continue reglan, benadryl and lorazepam   - add bentyl  - GI consult with dr lynn pending   - tolerating liquids. advance as tolerated   - check CT scan - results pending    #Hypokalemia 2/2 vomiting   - repleted check BMp in AM     #Leukocytosis - reactive vs self limited viral illness that may have contributed to vomiting.   - resolved.     #Anxiety   - continue escitalopram     #VTE PPX  - lovenox     
HPI:  The patient is a 23y year with significant PMH of anxiety, tobacco and marijuana abuse had been seen in [facility] here yesterday with c/o nausea and vomiting. She was found hypokalemia, was supplemented, and was discharged home from ED after hydration with IV fluid.  She says that she has persistent vomiting since Saturday morning, and she is not able to keep anything down to her stomach. She has not eaten anything for last 2 days due to persistent vomiting. She also has epigastric abdominal pain due to vomiting. Denies any hematemesis or hemoptysis. She smokes 10 cig daily and smokes marijuana daily. She also had binge drinking on Friday. She feels cold and chills. She denies chest pain, sob, headache, palpitation, diarrhea or dysuria. Couple days ago, she had posterior head strike, but CT head is negative for any acute intracranial abnormality, and denies headache,  Lipase level is normal. K level is 4.0, WBC count is 13.32, Hb 13.9, BUN 9.0, Cr 0.73, Mg 2.2.  CT head negative.    NS 2 L bolus, Zofran 4.00 mg IV x2 doses and Reglan 10 mg IV given in ED, and she still has every hour vomiting.    (02 Oct 2023 22:24)      10/3: Pt seen and evaluated. Reports having nausea and episodes of vomiting this morning. Also reports having abdominal discomfort but denies pain. States she had a small BM yesterday. Diet was changed from NPO to clear liquid and pt reports she will try to eat today.       REVIEW OF SYSTEMS:    CONSTITUTIONAL: No weakness, fevers or chills  EYES/ENT: No visual changes;  No vertigo or throat pain   NECK: No pain or stiffness  RESPIRATORY: No cough, wheezing, hemoptysis; No shortness of breath  CARDIOVASCULAR: No chest pain or palpitations  GASTROINTESTINAL: +nausea, +vomiting, No abdominal or epigastric pain. No hematemesis; No diarrhea or constipation. No melena or hematochezia.  GENITOURINARY: No dysuria, frequency or hematuria  NEUROLOGICAL: No numbness or weakness  SKIN: No itching, rashes        MEDICATIONS  (STANDING):  dextrose 5% + sodium chloride 0.9% with potassium chloride 20 mEq/L 1000 milliLiter(s) (75 mL/Hr) IV Continuous <Continuous>  enoxaparin Injectable 40 milliGRAM(s) SubCutaneous every 24 hours  escitalopram 20 milliGRAM(s) Oral at bedtime  ondansetron Injectable 8 milliGRAM(s) IV Push every 8 hours    MEDICATIONS  (PRN):  acetaminophen     Tablet .. 650 milliGRAM(s) Oral every 6 hours PRN Mild Pain (1 - 3)  aluminum hydroxide/magnesium hydroxide/simethicone Suspension 30 milliLiter(s) Oral every 4 hours PRN Dyspepsia  diphenhydrAMINE Injectable 25 milliGRAM(s) IV Push every 8 hours PRN Nausea/Vomiting (With lorazepam)  LORazepam   Injectable 0.5 milliGRAM(s) IV Push every 8 hours PRN Nausea and/or Vomiting (With diphenhydramine)  melatonin 3 milliGRAM(s) Oral at bedtime PRN Insomnia      Vital Signs Last 24 Hrs  T(C): 36.7 (03 Oct 2023 08:48), Max: 36.9 (02 Oct 2023 17:00)  T(F): 98 (03 Oct 2023 08:48), Max: 98.4 (02 Oct 2023 17:00)  HR: 62 (03 Oct 2023 08:48) (58 - 86)  BP: 135/88 (03 Oct 2023 08:48) (105/58 - 135/88)  BP(mean): 100 (03 Oct 2023 04:41) (81 - 100)  RR: 18 (03 Oct 2023 08:48) (18 - 18)  SpO2: 100% (03 Oct 2023 08:48) (99% - 100%)    Parameters below as of 03 Oct 2023 08:48  Patient On (Oxygen Delivery Method): room air        GEN: NAD, comfortable, resting in bed  HEENT: NC/AT, normal hearing, marcela dentition   NECK: supple, no JVD, no LAD, no thyromegaly  BACK:  ROM intact, no spinal/paraspinal tenderness  CV: S1S2, RRR, no murmur  RESP: good air movement, CTABL, no rales, rhonchi or wheezing, respirations unlabored  ABD: +BS, soft, ND, NT, no guarding, no rigidity, no HSM  EXT: +2 radial and pedal pulses, no edema, no calve tenderness  SKIN: No visible Rashes or Ulcers  MSK: ROM intact in all extremities  NEURO: sensory intact, no motor deficits, AOx3  PSYCH: normal behavior         LABS:                          13.3   8.22  )-----------( 308      ( 03 Oct 2023 06:22 )             39.2     03 Oct 2023 06:22    140    |  107    |  6      ----------------------------<  122    3.2     |  27     |  0.72     Ca    9.0        03 Oct 2023 06:22  Mg     2.2       02 Oct 2023 13:13    TPro  8.1    /  Alb  4.1    /  TBili  0.5    /  DBili  x      /  AST  19     /  ALT  28     /  AlkPhos  82     02 Oct 2023 13:13    LIVER FUNCTIONS - ( 02 Oct 2023 13:13 )  Alb: 4.1 g/dL / Pro: 8.1 gm/dL / ALK PHOS: 82 U/L / ALT: 28 U/L / AST: 19 U/L / GGT: x             CAPILLARY BLOOD GLUCOSE            Urinalysis Basic - ( 03 Oct 2023 09:00 )    Color: Yellow / Appearance: Clear / S.010 / pH: x  Gluc: x / Ketone: Small  / Bili: Negative / Urobili: Negative   Blood: x / Protein: Negative / Nitrite: Negative   Leuk Esterase: Moderate / RBC: Negative /HPF / WBC x   Sq Epi: x / Non Sq Epi: x / Bacteria: Few        RADIOLOGY:  < from: CT Head No Cont (10.02.23 @ 14:42) >  IMPRESSION:    No evidence of acute intracranial hemorrhage or midline shift.        
23y year with significant PMH of anxiety, tobacco and marijuana abuse had been seen in [facility] here yesterday with c/o nausea and vomiting. She was found hypokalemia, was supplemented, and was discharged home from ED after hydration with IV fluid.  She says that she has persistent vomiting since Saturday morning, and she is not able to keep anything down to her stomach. She has not eaten anything for last 2 days due to persistent vomiting. She also has epigastric abdominal pain due to vomiting. Denies any hematemesis or hemoptysis. She smokes 10 cig daily and smokes marijuana daily. She also had binge drinking on Friday. She feels cold and chills. She denies chest pain, sob, headache, palpitation, diarrhea or dysuria. Couple days ago, she had posterior head strike, but CT head is negative for any acute intracranial abnormality, and denies headache,  Lipase level is normal. K level is 4.0, WBC count is 13.32, Hb 13.9, BUN 9.0, Cr 0.73, Mg 2.2.  CT head negative. NS 2 L bolus, Zofran 4.00 mg IV x2 doses and Reglan 10 mg IV given in ED, and she still has every hour vomiting.       Subjective: Patient seen today, feels better, 1 episode of vomiting overnight.     REVIEW OF SYSTEMS:    CONSTITUTIONAL: No weakness, fevers or chills  EYES/ENT: No visual changes;  No vertigo or throat pain   NECK: No pain or stiffness  RESPIRATORY: No cough, wheezing, hemoptysis; No shortness of breath  CARDIOVASCULAR: No chest pain or palpitations  GASTROINTESTINAL: No abdominal or epigastric pain. No nausea, vomiting, or hematemesis; No diarrhea or constipation. No melena or hematochezia.  GENITOURINARY: No dysuria, frequency or hematuria  NEUROLOGICAL: No numbness or weakness  SKIN: No itching, rashes        MEDICATIONS  (STANDING):  dextrose 5% + sodium chloride 0.9% with potassium chloride 20 mEq/L 1000 milliLiter(s) (75 mL/Hr) IV Continuous <Continuous>  dicyclomine 20 milliGRAM(s) Oral three times a day before meals  enoxaparin Injectable 40 milliGRAM(s) SubCutaneous every 24 hours  escitalopram 20 milliGRAM(s) Oral at bedtime  metoclopramide Injectable 10 milliGRAM(s) IV Push every 6 hours    MEDICATIONS  (PRN):  acetaminophen     Tablet .. 650 milliGRAM(s) Oral every 6 hours PRN Mild Pain (1 - 3)  aluminum hydroxide/magnesium hydroxide/simethicone Suspension 30 milliLiter(s) Oral every 4 hours PRN Dyspepsia  diphenhydrAMINE Injectable 25 milliGRAM(s) IV Push every 8 hours PRN Nausea/Vomiting (With lorazepam)  LORazepam   Injectable 0.5 milliGRAM(s) IV Push every 8 hours PRN Nausea and/or Vomiting (With diphenhydramine)  melatonin 3 milliGRAM(s) Oral at bedtime PRN Insomnia      Vital Signs Last 24 Hrs  T(C): 37.7 (06 Oct 2023 08:49), Max: 37.7 (06 Oct 2023 08:49)  T(F): 99.8 (06 Oct 2023 08:49), Max: 99.8 (06 Oct 2023 08:49)  HR: 62 (06 Oct 2023 08:49) (58 - 62)  BP: 129/65 (06 Oct 2023 08:49) (121/61 - 129/65)  RR: 18 (06 Oct 2023 08:49) (18 - 18)  SpO2: 100% (06 Oct 2023 08:49) (100% - 100%)    Parameters below as of 06 Oct 2023 08:49  Patient On (Oxygen Delivery Method): room air      PHYSICAL EXAM:  GENERAL: NAD, lying in bed comfortably  HEAD:  Atraumatic, Normocephalic  EYES: conjunctiva and sclera clear  ENT: Moist mucous membranes  NECK: Supple, No JVD  CHEST/LUNG: Clear to auscultation bilaterally; No rales, rhonchi, wheezing. Unlabored respirations  HEART: Regular rate and rhythm; No murmurs  ABDOMEN: Bowel sounds present; Soft, Nontender, Nondistended.   EXTREMITIES:  2+ Peripheral Pulses, brisk capillary refill. No clubbing, cyanosis, or edema  NERVOUS SYSTEM:  Alert & Oriented X3, speech clear. No deficits   MSK: FROM all 4 extremities, full and equal strength        LABS:      06 Oct 2023 06:32    136    |  105    |  5      ----------------------------<  121    3.7     |  28     |  0.76     Ca    9.0        06 Oct 2023 06:32  Mg     1.9       05 Oct 2023 06:30          CAPILLARY BLOOD GLUCOSE            Urinalysis Basic - ( 06 Oct 2023 06:32 )    Color: x / Appearance: x / SG: x / pH: x  Gluc: 121 mg/dL / Ketone: x  / Bili: x / Urobili: x   Blood: x / Protein: x / Nitrite: x   Leuk Esterase: x / RBC: x / WBC x   Sq Epi: x / Non Sq Epi: x / Bacteria: x        RADIOLOGY:        < from: CT Abdomen and Pelvis w/ Oral Cont and w/ IV Cont (10.05.23 @ 14:48) >  FINDINGS:  LOWER CHEST: Within normal limits.    LIVER: Within normal limits.  BILE DUCTS: Normal caliber.  GALLBLADDER: Within normal limits.  SPLEEN: Within normal limits.  PANCREAS: Within normal limits.  ADRENALS: Within normal limits.  KIDNEYS/URETERS: Cyst in the medial upper pole of the left kidney.   Otherwise, within normal limits.    BLADDER: Within normal limits.  REPRODUCTIVE ORGANS: Small right ovarian cyst. The uterus and left adnexa   are unremarkable by CT criteria.    BOWEL: No bowel obstruction. Appendix is normal.  PERITONEUM: No ascites.  VESSELS: Within normal limits.  RETROPERITONEUM/LYMPH NODES: No lymphadenopathy.  ABDOMINAL WALL: Within normal limits.  BONES: Within normal limits.    IMPRESSION: No evidence of acute inflammatory or obstructive process in   the abdomen and pelvis.      
HPI:  The patient is a 23y year with significant PMH of anxiety, tobacco and marijuana abuse had been seen in [facility] here yesterday with c/o nausea and vomiting. She was found hypokalemia, was supplemented, and was discharged home from ED after hydration with IV fluid.  She says that she has persistent vomiting since Saturday morning, and she is not able to keep anything down to her stomach. She has not eaten anything for last 2 days due to persistent vomiting. She also has epigastric abdominal pain due to vomiting. Denies any hematemesis or hemoptysis. She smokes 10 cig daily and smokes marijuana daily. She also had binge drinking on Friday. She feels cold and chills. She denies chest pain, sob, headache, palpitation, diarrhea or dysuria. Couple days ago, she had posterior head strike, but CT head is negative for any acute intracranial abnormality, and denies headache,  Lipase level is normal. K level is 4.0, WBC count is 13.32, Hb 13.9, BUN 9.0, Cr 0.73, Mg 2.2.  CT head negative.    NS 2 L bolus, Zofran 4.00 mg IV x2 doses and Reglan 10 mg IV given in ED, and she still has every hour vomiting.    (02 Oct 2023 22:24)      10/3: Pt seen and evaluated. Reports having nausea and episodes of vomiting this morning. Also reports having abdominal discomfort but denies pain. States she had a small BM yesterday. Diet was changed from NPO to clear liquid and pt reports she will try to eat today.   10/4 - still with n/v. tolerating clears.  mother updated    ROS:   All 10 systems reviewed and found to be negative with the exception of what has been described above.    Vital Signs Last 24 Hrs  T(C): 36.7 (03 Oct 2023 08:48), Max: 36.9 (02 Oct 2023 17:00)  T(F): 98 (03 Oct 2023 08:48), Max: 98.4 (02 Oct 2023 17:00)  HR: 62 (03 Oct 2023 08:48) (58 - 86)  BP: 135/88 (03 Oct 2023 08:48) (105/58 - 135/88)  BP(mean): 100 (03 Oct 2023 04:41) (81 - 100)  RR: 18 (03 Oct 2023 08:48) (18 - 18)  SpO2: 100% (03 Oct 2023 08:48) (99% - 100%)    Parameters below as of 03 Oct 2023 08:48  Patient On (Oxygen Delivery Method): room air        GEN: NAD, comfortable, resting in bed  HEENT: NC/AT, normal hearing, marcela dentition   NECK: supple, no JVD, no LAD, no thyromegaly  BACK:  ROM intact, no spinal/paraspinal tenderness  CV: S1S2, RRR, no murmur  RESP: good air movement, CTABL, no rales, rhonchi or wheezing, respirations unlabored  ABD: +BS, soft, ND, NT, no guarding, no rigidity, no HSM  EXT: +2 radial and pedal pulses, no edema, no calve tenderness  SKIN: No visible Rashes or Ulcers  MSK: ROM intact in all extremities  NEURO: sensory intact, no motor deficits, AOx3  PSYCH: normal behavior         LABS:                          13.3   8.22  )-----------( 308      ( 03 Oct 2023 06:22 )             39.2     03 Oct 2023 06:22    140    |  107    |  6      ----------------------------<  122    3.2     |  27     |  0.72     Ca    9.0        03 Oct 2023 06:22  Mg     2.2       02 Oct 2023 13:13    TPro  8.1    /  Alb  4.1    /  TBili  0.5    /  DBili  x      /  AST  19     /  ALT  28     /  AlkPhos  82     02 Oct 2023 13:13    LIVER FUNCTIONS - ( 02 Oct 2023 13:13 )  Alb: 4.1 g/dL / Pro: 8.1 gm/dL / ALK PHOS: 82 U/L / ALT: 28 U/L / AST: 19 U/L / GGT: x             CAPILLARY BLOOD GLUCOSE            Urinalysis Basic - ( 03 Oct 2023 09:00 )    Color: Yellow / Appearance: Clear / S.010 / pH: x  Gluc: x / Ketone: Small  / Bili: Negative / Urobili: Negative   Blood: x / Protein: Negative / Nitrite: Negative   Leuk Esterase: Moderate / RBC: Negative /HPF / WBC x   Sq Epi: x / Non Sq Epi: x / Bacteria: Few        RADIOLOGY:  < from: CT Head No Cont (10.02.23 @ 14:42) >  IMPRESSION:    No evidence of acute intracranial hemorrhage or midline shift.

## 2023-10-06 NOTE — DISCHARGE NOTE NURSING/CASE MANAGEMENT/SOCIAL WORK - NSDCPEEMAIL_GEN_ALL_CORE
[facility] for Tobacco Control email [email or web address]

## 2023-10-06 NOTE — PROGRESS NOTE ADULT - ASSESSMENT
#Nausea and Vomiting   #Cyclic vomiting likely 2/2 marijuana use   - continue reglan, benadryl and lorazepam   - add bentyl  - CT abdomen without acute pathology   -F/u with outpt GI, Dr. Brannon     #Hypokalemia 2/2 vomiting   - repleted check BMp in AM     #Leukocytosis - reactive vs self limited viral illness that may have contributed to vomiting.   - resolved.     #Anxiety   - continue escitalopram     #VTE PPX  - lovenox     DIPSO: d/c today

## 2023-10-06 NOTE — DISCHARGE NOTE NURSING/CASE MANAGEMENT/SOCIAL WORK - NSDCPEFALRISK_GEN_ALL_CORE
For information on Fall & Injury Prevention, visit: [email or web address] OR  [email or web address] OR  https://www.cdc.gov/steadi/patient.html

## 2023-10-06 NOTE — DISCHARGE NOTE NURSING/CASE MANAGEMENT/SOCIAL WORK - PATIENT PORTAL LINK FT
You can access the FollowMyHealth Patient Portal offered by [facility] by registering at the following website: [email or web address]. By joining [facility]’s FollowMyHealth portal, you will also be able to view your health information using other applications (apps) compatible with our system.

## 2023-10-15 DIAGNOSIS — D72.829 ELEVATED WHITE BLOOD CELL COUNT, UNSPECIFIED: ICD-10-CM

## 2023-10-15 DIAGNOSIS — E03.9 HYPOTHYROIDISM, UNSPECIFIED: ICD-10-CM

## 2023-10-15 DIAGNOSIS — F41.9 ANXIETY DISORDER, UNSPECIFIED: ICD-10-CM

## 2023-10-15 DIAGNOSIS — E87.6 HYPOKALEMIA: ICD-10-CM

## 2023-10-15 DIAGNOSIS — Z72.0 TOBACCO USE: ICD-10-CM

## 2023-10-15 DIAGNOSIS — R11.15 CYCLICAL VOMITING SYNDROME UNRELATED TO MIGRAINE: ICD-10-CM

## 2023-10-15 DIAGNOSIS — F12.99 CANNABIS USE, UNSPECIFIED WITH UNSPECIFIED CANNABIS-INDUCED DISORDER: ICD-10-CM

## 2024-02-24 NOTE — H&P ADULT - SOCIAL HISTORY: ALCOHOL USE
Patient is a 78y old  Male who presents with a chief complaint of Chest Pain x 3 months (19 Feb 2024 14:13)      SUBJECTIVE / OVERNIGHT EVENTS: no events   Patient feels fine     T(C): 36.9 (02-24-24 @ 12:10), Max: 37.1 (02-24-24 @ 05:24)  HR: 60 (02-24-24 @ 12:10) (60 - 62)  BP: 103/50 (02-24-24 @ 12:10) (103/50 - 129/65)  RR: 18 (02-24-24 @ 12:10) (18 - 18)  SpO2: 97% (02-24-24 @ 12:10) (97% - 100%)      MEDICATIONS  (STANDING):  aspirin  chewable 81 milliGRAM(s) Oral daily  atorvastatin 20 milliGRAM(s) Oral at bedtime  carvedilol 25 milliGRAM(s) Oral every 12 hours  chlorhexidine 2% Cloths 1 Application(s) Topical <User Schedule>  dextrose 5%. 1000 milliLiter(s) (100 mL/Hr) IV Continuous <Continuous>  dextrose 5%. 1000 milliLiter(s) (50 mL/Hr) IV Continuous <Continuous>  dextrose 50% Injectable 12.5 Gram(s) IV Push once  dextrose 50% Injectable 25 Gram(s) IV Push once  dextrose 50% Injectable 25 Gram(s) IV Push once  glucagon  Injectable 1 milliGRAM(s) IntraMuscular once  insulin lispro (ADMELOG) corrective regimen sliding scale   SubCutaneous three times a day before meals  insulin lispro (ADMELOG) corrective regimen sliding scale   SubCutaneous at bedtime    MEDICATIONS  (PRN):  dextrose Oral Gel 15 Gram(s) Oral once PRN Blood Glucose LESS THAN 70 milliGRAM(s)/deciliter        PHYSICAL EXAM:  GENERAL: NAD, well-developed  HEAD:  Atraumatic, Normocephalic  EYES: EOMI, conjunctiva and sclera clear  NECK: Supple, No JVD  CHEST/LUNG: Clear to auscultation bilaterally; No wheeze  HEART: Regular rate and rhythm; No murmurs, rubs, or gallops  ABDOMEN: Soft, Nontender, Nondistended; Bowel sounds present  EXTREMITIES:  2+ Peripheral Pulses, No clubbing, cyanosis, or edema  PSYCH: AAOx3  NEUROLOGY: non-focal  SKIN: No rashes or lesions                                9.7    5.30  )-----------( 81       ( 23 Feb 2024 07:23 )             30.8               CAPILLARY BLOOD GLUCOSE      POCT Blood Glucose.: 183 mg/dL (24 Feb 2024 12:04)  POCT Blood Glucose.: 143 mg/dL (24 Feb 2024 08:14)  POCT Blood Glucose.: 139 mg/dL (23 Feb 2024 22:19)  POCT Blood Glucose.: 134 mg/dL (23 Feb 2024 16:22)    RADIOLOGY & ADDITIONAL TESTS:    Imaging Personally Reviewed:    Consultant(s) Notes Reviewed:      Care Discussed with Consultants/Other Providers:   May drink 3-4 drinks on the weekends when going out, usually not during the week

## 2024-03-06 PROBLEM — F12.10 CANNABIS ABUSE, UNCOMPLICATED: Chronic | Status: ACTIVE | Noted: 2023-10-02

## 2024-03-06 PROBLEM — Z72.0 TOBACCO USE: Chronic | Status: ACTIVE | Noted: 2023-10-02

## 2024-04-05 ENCOUNTER — NON-APPOINTMENT (OUTPATIENT)
Age: 24
End: 2024-04-05

## 2024-04-05 ENCOUNTER — TRANSCRIPTION ENCOUNTER (OUTPATIENT)
Age: 24
End: 2024-04-05

## 2024-04-05 ENCOUNTER — APPOINTMENT (OUTPATIENT)
Dept: OBGYN | Facility: CLINIC | Age: 24
End: 2024-04-05
Payer: COMMERCIAL

## 2024-04-05 ENCOUNTER — ASOB RESULT (OUTPATIENT)
Age: 24
End: 2024-04-05

## 2024-04-05 VITALS
SYSTOLIC BLOOD PRESSURE: 116 MMHG | DIASTOLIC BLOOD PRESSURE: 78 MMHG | OXYGEN SATURATION: 97 % | BODY MASS INDEX: 23.22 KG/M2 | HEART RATE: 86 BPM | WEIGHT: 136 LBS | RESPIRATION RATE: 16 BRPM | HEIGHT: 64 IN

## 2024-04-05 DIAGNOSIS — Z34.90 ENCOUNTER FOR SUPERVISION OF NORMAL PREGNANCY, UNSPECIFIED, UNSPECIFIED TRIMESTER: ICD-10-CM

## 2024-04-05 PROBLEM — Z00.00 ENCOUNTER FOR PREVENTIVE HEALTH EXAMINATION: Status: ACTIVE | Noted: 2024-04-05

## 2024-04-05 PROCEDURE — 99214 OFFICE O/P EST MOD 30 MIN: CPT | Mod: 25

## 2024-04-05 PROCEDURE — 76801 OB US < 14 WKS SINGLE FETUS: CPT

## 2024-04-05 PROCEDURE — 76817 TRANSVAGINAL US OBSTETRIC: CPT

## 2024-04-05 NOTE — HISTORY OF PRESENT ILLNESS
[Patient declined mammogram] : Patient declined mammogram [Patient declined breast sonogram] : Patient declined breast sonogram [Patient reported PAP Smear was normal] : Patient reported PAP Smear was normal [Patient declined bone density test] : Patient declined bone density test [Patient declined colonoscopy] : Patient declined colonoscopy [FreeTextEntry1] : 23-year-old LMP 2/15/2024 at 7 weeks and 0 days presents with early positive pregnancy test.  Patient has regular menses no breakthrough bleeding her last menstrual period was described as normal.  She denies any significant past medical history no known drug allergies she is a non-smoker and not vapor and is on only a prenatal vitamin at this time.  Transvaginal sonogram today reveals her to be 13 weeks and 0 days giving her a due date of 10/11/2024 which her best KAROL based on early sonogram no adnexal masses seen cervix long without funneling.  Patient denies morning sickness at this time she had it early on but it has resolved already.  She is tolerating her prenatal vitamin very well. [TextBox_4] : New pt annual/ pregnancy confirmation  [PapSmeardate] : 3/2024 [LMPDate] : 2/15//24

## 2024-04-05 NOTE — PLAN
Continue DAPT and statin  Monitor for symptoms of angina         [FreeTextEntry1] : Patient is 13 weeks and 0 days by early sonogram today I have recommended her have genetic counseling Ultra-Screen being that she is 13 weeks we will attempt to make these appointments for her today so she can be seen as soon as possible.  Will continue with her prenatal vitamins given a referral for routine blood work patient states she is a negative and we will send father baby for blood type and screen as well patient will return in 1 week for ACOG exam.  Patient states she is up-to-date with her Pap smears which was just done on March 22, 2024 which was within normal limits.

## 2024-04-12 ENCOUNTER — APPOINTMENT (OUTPATIENT)
Dept: OBGYN | Facility: CLINIC | Age: 24
End: 2024-04-12

## 2024-08-01 ENCOUNTER — APPOINTMENT (OUTPATIENT)
Dept: OBGYN | Facility: CLINIC | Age: 24
End: 2024-08-01

## 2024-08-09 ENCOUNTER — APPOINTMENT (OUTPATIENT)
Dept: OBGYN | Facility: CLINIC | Age: 24
End: 2024-08-09

## 2024-08-09 PROBLEM — N92.6 MENSES, IRREGULAR: Status: ACTIVE | Noted: 2024-08-09

## 2024-08-09 PROCEDURE — 99214 OFFICE O/P EST MOD 30 MIN: CPT | Mod: 25

## 2024-08-09 PROCEDURE — 76830 TRANSVAGINAL US NON-OB: CPT

## 2024-08-09 NOTE — COUNSELING
[Nutrition/ Exercise/ Weight Management] : nutrition, exercise, weight management [Vitamins/Supplements] : vitamins/supplements [Breast Self Exam] : breast self exam [Pregnancy Options] : pregnancy options [Vaccines] : vaccines

## 2024-08-09 NOTE — REVIEW OF SYSTEMS
[Patient Intake Form Reviewed] : Patient intake form was reviewed [Negative] : Heme/Lymph [Nausea] : nausea [FreeTextEntry8] : Irregular menses

## 2024-08-09 NOTE — HISTORY OF PRESENT ILLNESS
[TextBox_4] : PT HERE FOR A PREGNANCY CONFIRMATION LMP: 6/11/24 KAROL: 3/18/25 GA: 8 WEEKS 3 DAYS [LMPDate] : 6/11/24 [TextBox_6] : 6/11/24 [FreeTextEntry1] : 6/11/24

## 2024-08-09 NOTE — PLAN
[FreeTextEntry1] : Teen blood work sequential 1 and 2 screening plus telehealth by genetics continue prenatal vitamins nutritional and exercise counseling completed return here in 4 weeks for ACOG exam

## 2024-09-03 ENCOUNTER — NON-APPOINTMENT (OUTPATIENT)
Age: 24
End: 2024-09-03

## 2024-09-04 ENCOUNTER — APPOINTMENT (OUTPATIENT)
Dept: OBGYN | Facility: CLINIC | Age: 24
End: 2024-09-04
Payer: COMMERCIAL

## 2024-09-04 VITALS
HEIGHT: 64 IN | BODY MASS INDEX: 27.31 KG/M2 | DIASTOLIC BLOOD PRESSURE: 83 MMHG | SYSTOLIC BLOOD PRESSURE: 133 MMHG | HEART RATE: 85 BPM | WEIGHT: 160 LBS | OXYGEN SATURATION: 98 %

## 2024-09-04 DIAGNOSIS — Z11.51 ENCOUNTER FOR SCREENING FOR HUMAN PAPILLOMAVIRUS (HPV): ICD-10-CM

## 2024-09-04 DIAGNOSIS — Z11.3 ENCOUNTER FOR SCREENING FOR INFECTIONS WITH A PREDOMINANTLY SEXUAL MODE OF TRANSMISSION: ICD-10-CM

## 2024-09-04 DIAGNOSIS — Z01.419 ENCOUNTER FOR GYNECOLOGICAL EXAMINATION (GENERAL) (ROUTINE) W/OUT ABNORMAL FINDINGS: ICD-10-CM

## 2024-09-04 PROCEDURE — 0500F INITIAL PRENATAL CARE VISIT: CPT

## 2024-09-04 RX ORDER — PRENATAL VIT 49/IRON FUM/FOLIC 6.75-0.2MG
TABLET ORAL
Refills: 0 | Status: ACTIVE | COMMUNITY

## 2024-09-05 LAB
APPEARANCE: CLEAR
BILIRUBIN URINE: NEGATIVE
BLOOD URINE: NEGATIVE
COLOR: YELLOW
GLUCOSE QUALITATIVE U: NEGATIVE MG/DL
KETONES URINE: NEGATIVE MG/DL
LEUKOCYTE ESTERASE URINE: NEGATIVE
NITRITE URINE: NEGATIVE
PH URINE: 6.5
PROTEIN URINE: NEGATIVE MG/DL
SPECIFIC GRAVITY URINE: 1.02
UROBILINOGEN URINE: 0.2 MG/DL

## 2024-09-06 LAB
C TRACH RRNA SPEC QL NAA+PROBE: NOT DETECTED
HPV HIGH+LOW RISK DNA PNL CVX: NOT DETECTED
N GONORRHOEA RRNA SPEC QL NAA+PROBE: NOT DETECTED
SOURCE TP AMPLIFICATION: NORMAL

## 2024-09-09 ENCOUNTER — APPOINTMENT (OUTPATIENT)
Dept: ANTEPARTUM | Facility: CLINIC | Age: 24
End: 2024-09-09
Payer: COMMERCIAL

## 2024-09-09 ENCOUNTER — ASOB RESULT (OUTPATIENT)
Age: 24
End: 2024-09-09

## 2024-09-09 ENCOUNTER — NON-APPOINTMENT (OUTPATIENT)
Age: 24
End: 2024-09-09

## 2024-09-09 PROCEDURE — 36415 COLL VENOUS BLD VENIPUNCTURE: CPT

## 2024-09-09 PROCEDURE — 76813 OB US NUCHAL MEAS 1 GEST: CPT

## 2024-09-10 ENCOUNTER — APPOINTMENT (OUTPATIENT)
Dept: ANTEPARTUM | Facility: CLINIC | Age: 24
End: 2024-09-10
Payer: COMMERCIAL

## 2024-09-10 ENCOUNTER — APPOINTMENT (OUTPATIENT)
Dept: MATERNAL FETAL MEDICINE | Facility: CLINIC | Age: 24
End: 2024-09-10
Payer: COMMERCIAL

## 2024-09-10 ENCOUNTER — ASOB RESULT (OUTPATIENT)
Age: 24
End: 2024-09-10

## 2024-09-10 PROCEDURE — 36416 COLLJ CAPILLARY BLOOD SPEC: CPT

## 2024-09-10 PROCEDURE — 99212 OFFICE O/P EST SF 10 MIN: CPT | Mod: 95

## 2024-09-14 LAB
ADDITIONAL US: NORMAL
COMMENTS: AFP: NORMAL
CRL SCAN TWIN B: NORMAL
CRL SCAN: NORMAL
CROWN RUMP LENGTH TWIN B: NORMAL
CROWN RUMP LENGTH: 67.4 MM
DOWN SYNDROME AGE RISK: NORMAL
DOWN SYNDROME INTERPRETATION: NORMAL
DOWN SYNDROME SCREENING RISK: NORMAL
GEST. AGE ON COLLECTION DATE: 12.9 WEEKS
HCG MOM: 0.63
HCG VALUE: 51.2 IU/ML
MATERNAL AGE AT EDD: 25.5 YR
NOTE: AFP: NORMAL
NT MOM TWIN B: NORMAL
NT TWIN B: NORMAL
NUCHAL TRANSLUCENCY (NT): 1.7 MM
NUCHAL TRANSLUCENCY MOM: 0.96
NUMBER OF FETUSES: 1
PAPP-A MOM: 0.98
PAPP-A VALUE: 971.7 NG/ML
RACE: NORMAL
RESULTS AFP: NORMAL
SONOGRAPHER ID#: NORMAL
SUBMIT PART 2 SAMPLE USING: NORMAL
TEST RESULTS: AFP: NORMAL
TRISOMY 18 AGE RISK: NORMAL
TRISOMY 18 INTERPRETATION: NORMAL
TRISOMY 18 SCREENING RISK: NORMAL
WEIGHT AFP: 168 LBS

## 2024-09-17 ENCOUNTER — NON-APPOINTMENT (OUTPATIENT)
Age: 24
End: 2024-09-17

## 2024-09-18 ENCOUNTER — NON-APPOINTMENT (OUTPATIENT)
Age: 24
End: 2024-09-18

## 2024-09-27 ENCOUNTER — NON-APPOINTMENT (OUTPATIENT)
Age: 24
End: 2024-09-27

## 2024-09-30 ENCOUNTER — APPOINTMENT (OUTPATIENT)
Dept: ANTEPARTUM | Facility: CLINIC | Age: 24
End: 2024-09-30
Payer: COMMERCIAL

## 2024-09-30 PROCEDURE — 36415 COLL VENOUS BLD VENIPUNCTURE: CPT

## 2024-10-01 ENCOUNTER — APPOINTMENT (OUTPATIENT)
Dept: OBGYN | Facility: CLINIC | Age: 24
End: 2024-10-01
Payer: COMMERCIAL

## 2024-10-01 VITALS
SYSTOLIC BLOOD PRESSURE: 125 MMHG | HEIGHT: 64 IN | WEIGHT: 159 LBS | DIASTOLIC BLOOD PRESSURE: 75 MMHG | BODY MASS INDEX: 27.14 KG/M2 | HEART RATE: 92 BPM

## 2024-10-01 DIAGNOSIS — O21.9 VOMITING OF PREGNANCY, UNSPECIFIED: ICD-10-CM

## 2024-10-01 DIAGNOSIS — O26.899 OTHER SPECIFIED PREGNANCY RELATED CONDITIONS, UNSPECIFIED TRIMESTER: ICD-10-CM

## 2024-10-01 DIAGNOSIS — R51.9 OTHER SPECIFIED PREGNANCY RELATED CONDITIONS, UNSPECIFIED TRIMESTER: ICD-10-CM

## 2024-10-01 DIAGNOSIS — Z01.419 ENCOUNTER FOR GYNECOLOGICAL EXAMINATION (GENERAL) (ROUTINE) W/OUT ABNORMAL FINDINGS: ICD-10-CM

## 2024-10-01 DIAGNOSIS — R51.9 HEADACHE, UNSPECIFIED: ICD-10-CM

## 2024-10-01 DIAGNOSIS — Z3A.16 16 WEEKS GESTATION OF PREGNANCY: ICD-10-CM

## 2024-10-01 DIAGNOSIS — Z11.3 ENCOUNTER FOR SCREENING FOR INFECTIONS WITH A PREDOMINANTLY SEXUAL MODE OF TRANSMISSION: ICD-10-CM

## 2024-10-01 DIAGNOSIS — G89.29 HEADACHE, UNSPECIFIED: ICD-10-CM

## 2024-10-01 DIAGNOSIS — Z11.51 ENCOUNTER FOR SCREENING FOR HUMAN PAPILLOMAVIRUS (HPV): ICD-10-CM

## 2024-10-01 LAB
BILIRUB UR QL STRIP: NEGATIVE
CLARITY UR: CLEAR
COLLECTION METHOD: NORMAL
GLUCOSE UR-MCNC: NEGATIVE
HCG UR QL: 0.2 EU/DL
HGB UR QL STRIP.AUTO: NORMAL
KETONES UR-MCNC: 40
LEUKOCYTE ESTERASE UR QL STRIP: NORMAL
NITRITE UR QL STRIP: NEGATIVE
PH UR STRIP: 6
PROT UR STRIP-MCNC: NEGATIVE
SP GR UR STRIP: >=1.03

## 2024-10-01 PROCEDURE — 81002 URINALYSIS NONAUTO W/O SCOPE: CPT | Mod: NC

## 2024-10-01 PROCEDURE — 0502F SUBSEQUENT PRENATAL CARE: CPT

## 2024-10-01 RX ORDER — BUTALBITAL, ACETAMINOPHEN AND CAFFEINE 300; 50; 40 MG/1; MG/1; MG/1
50-300-40 CAPSULE ORAL 3 TIMES DAILY
Qty: 30 | Refills: 0 | Status: ACTIVE | COMMUNITY
Start: 2024-10-01 | End: 1900-01-01

## 2024-10-04 LAB
ADDITIONAL US: NORMAL
AFP MOM: 1.17
AFP VALUE: 34.2 NG/ML
COLLECTED ON 2: NORMAL
COLLECTED ON: NORMAL
CRL SCAN TWIN B: NORMAL
CRL SCAN: NORMAL
CROWN RUMP LENGTH TWIN B: NORMAL
CROWN RUMP LENGTH: 67.4 MM
DIA MOM: 0.85
DIA VALUE: 136.4 PG/ML
DOWN SYNDROME AGE RISK: NORMAL
DOWN SYNDROME INTERPRETATION: NORMAL
DOWN SYNDROME SCREENING RISK: NORMAL
FIRST TRIMESTER SAMPLE: NORMAL
GEST. AGE ON COLLECTION DATE: 12.9 WEEKS
GESTATIONAL AGE: 15.9 WEEKS
HCG MOM: 0.55
HCG VALUE: 20.4 IU/ML
INSULIN DEP DIABETES: NO
MATERNAL AGE AT EDD: 25.5 YR
NT MOM TWIN B: NORMAL
NT TWIN B: NORMAL
NUCHAL TRANSLUCENCY (NT): 1.7 MM
NUCHAL TRANSLUCENCY MOM: 0.96
NUMBER OF FETUSES: 1
OPEN SPINA BIFIDA: NORMAL
OSB INTERPRETATION: NORMAL
PAPP-A MOM: 0.98
PAPP-A VALUE: 971.7 NG/ML
RACE: NORMAL
SECOND TRIMESTER SAMPLE: NORMAL
SEQUENTIAL 2 COMMENTS: NORMAL
SEQUENTIAL 2 NOTE: NORMAL
SEQUENTIAL 2 RESULTS: NORMAL
SEQUENTIAL 2 TEST RESULTS: NORMAL
SONOGRAPHER ID#: NORMAL
TRISOMY 18 AGE RISK: NORMAL
TRISOMY 18 INTERPRETATION: NORMAL
TRISOMY 18 SCREENING RISK: NORMAL
UE3 MOM: 0.82
UE3 VALUE: 0.65 NG/ML
WEIGHT AFP: 168 LBS
WEIGHT: 160 LBS

## 2024-10-07 ENCOUNTER — NON-APPOINTMENT (OUTPATIENT)
Age: 24
End: 2024-10-07

## 2024-10-18 ENCOUNTER — APPOINTMENT (OUTPATIENT)
Dept: OBGYN | Facility: CLINIC | Age: 24
End: 2024-10-18
Payer: COMMERCIAL

## 2024-10-18 VITALS
BODY MASS INDEX: 26.98 KG/M2 | SYSTOLIC BLOOD PRESSURE: 117 MMHG | HEART RATE: 74 BPM | DIASTOLIC BLOOD PRESSURE: 70 MMHG | WEIGHT: 158 LBS | HEIGHT: 64 IN

## 2024-10-18 LAB
GLUCOSE UR-MCNC: NORMAL
LEUKOCYTE ESTERASE UR QL STRIP: NORMAL
NITRITE UR QL STRIP: NORMAL
PROT UR STRIP-MCNC: NORMAL

## 2024-10-18 PROCEDURE — 0502F SUBSEQUENT PRENATAL CARE: CPT

## 2024-11-04 ENCOUNTER — ASOB RESULT (OUTPATIENT)
Age: 24
End: 2024-11-04

## 2024-11-04 ENCOUNTER — APPOINTMENT (OUTPATIENT)
Dept: ANTEPARTUM | Facility: CLINIC | Age: 24
End: 2024-11-04
Payer: COMMERCIAL

## 2024-11-04 PROCEDURE — 76811 OB US DETAILED SNGL FETUS: CPT

## 2024-11-04 PROCEDURE — 76817 TRANSVAGINAL US OBSTETRIC: CPT

## 2024-11-07 ENCOUNTER — NON-APPOINTMENT (OUTPATIENT)
Age: 24
End: 2024-11-07

## 2024-11-11 ENCOUNTER — NON-APPOINTMENT (OUTPATIENT)
Age: 24
End: 2024-11-11

## 2024-11-12 ENCOUNTER — APPOINTMENT (OUTPATIENT)
Dept: ANTEPARTUM | Facility: CLINIC | Age: 24
End: 2024-11-12
Payer: COMMERCIAL

## 2024-11-12 ENCOUNTER — ASOB RESULT (OUTPATIENT)
Age: 24
End: 2024-11-12

## 2024-11-12 DIAGNOSIS — O35.9XX0 MATERNAL CARE FOR (SUSPECTED) FETAL ABNORMALITY AND DAMAGE, UNSPECIFIED, NOT APPLICABLE OR UNSPECIFIED: ICD-10-CM

## 2024-11-12 PROCEDURE — 76816 OB US FOLLOW-UP PER FETUS: CPT

## 2024-11-15 ENCOUNTER — APPOINTMENT (OUTPATIENT)
Dept: OBGYN | Facility: CLINIC | Age: 24
End: 2024-11-15
Payer: COMMERCIAL

## 2024-11-15 ENCOUNTER — APPOINTMENT (OUTPATIENT)
Dept: PEDIATRIC CARDIOLOGY | Facility: CLINIC | Age: 24
End: 2024-11-15

## 2024-11-15 VITALS
BODY MASS INDEX: 27.49 KG/M2 | HEART RATE: 99 BPM | DIASTOLIC BLOOD PRESSURE: 81 MMHG | HEIGHT: 64 IN | WEIGHT: 161 LBS | SYSTOLIC BLOOD PRESSURE: 137 MMHG

## 2024-11-15 PROCEDURE — 0502F SUBSEQUENT PRENATAL CARE: CPT

## 2024-11-15 PROCEDURE — 99203 OFFICE O/P NEW LOW 30 MIN: CPT | Mod: 25

## 2024-11-15 PROCEDURE — 93325 DOPPLER ECHO COLOR FLOW MAPG: CPT | Mod: 59

## 2024-11-15 PROCEDURE — 76825 ECHO EXAM OF FETAL HEART: CPT

## 2024-11-15 PROCEDURE — 76827 ECHO EXAM OF FETAL HEART: CPT

## 2024-11-15 PROCEDURE — 76820 UMBILICAL ARTERY ECHO: CPT

## 2024-11-15 PROCEDURE — 76821 MIDDLE CEREBRAL ARTERY ECHO: CPT

## 2024-11-25 ENCOUNTER — NON-APPOINTMENT (OUTPATIENT)
Age: 24
End: 2024-11-25

## 2024-11-26 ENCOUNTER — APPOINTMENT (OUTPATIENT)
Dept: OBGYN | Facility: CLINIC | Age: 24
End: 2024-11-26
Payer: COMMERCIAL

## 2024-11-26 VITALS
DIASTOLIC BLOOD PRESSURE: 73 MMHG | HEIGHT: 64 IN | BODY MASS INDEX: 27.66 KG/M2 | HEART RATE: 77 BPM | WEIGHT: 162 LBS | SYSTOLIC BLOOD PRESSURE: 112 MMHG

## 2024-11-26 DIAGNOSIS — Z34.90 ENCOUNTER FOR SUPERVISION OF NORMAL PREGNANCY, UNSPECIFIED, UNSPECIFIED TRIMESTER: ICD-10-CM

## 2024-11-26 DIAGNOSIS — N87.0 MILD CERVICAL DYSPLASIA: ICD-10-CM

## 2024-11-26 PROCEDURE — 57452 EXAM OF CERVIX W/SCOPE: CPT

## 2024-12-13 ENCOUNTER — APPOINTMENT (OUTPATIENT)
Dept: OBGYN | Facility: CLINIC | Age: 24
End: 2024-12-13
Payer: COMMERCIAL

## 2024-12-13 VITALS
HEART RATE: 106 BPM | BODY MASS INDEX: 28.34 KG/M2 | DIASTOLIC BLOOD PRESSURE: 81 MMHG | SYSTOLIC BLOOD PRESSURE: 141 MMHG | HEIGHT: 64 IN | WEIGHT: 166 LBS

## 2024-12-13 VITALS
WEIGHT: 166 LBS | HEIGHT: 64 IN | BODY MASS INDEX: 28.34 KG/M2 | DIASTOLIC BLOOD PRESSURE: 68 MMHG | SYSTOLIC BLOOD PRESSURE: 113 MMHG

## 2024-12-13 PROCEDURE — 0502F SUBSEQUENT PRENATAL CARE: CPT

## 2024-12-21 LAB
BASOPHILS # BLD AUTO: 0.04 K/UL
BASOPHILS NFR BLD AUTO: 0.5 %
EOSINOPHIL # BLD AUTO: 0.16 K/UL
EOSINOPHIL NFR BLD AUTO: 2 %
GLUCOSE 1H P 50 G GLC PO SERPL-MCNC: 120 MG/DL
HCT VFR BLD CALC: 29.8 %
HGB BLD-MCNC: 10 G/DL
IMM GRANULOCYTES NFR BLD AUTO: 0.5 %
LYMPHOCYTES # BLD AUTO: 1.61 K/UL
LYMPHOCYTES NFR BLD AUTO: 19.9 %
MAN DIFF?: NORMAL
MCHC RBC-ENTMCNC: 29.4 PG
MCHC RBC-ENTMCNC: 33.6 G/DL
MCV RBC AUTO: 87.6 FL
MONOCYTES # BLD AUTO: 0.75 K/UL
MONOCYTES NFR BLD AUTO: 9.3 %
NEUTROPHILS # BLD AUTO: 5.48 K/UL
NEUTROPHILS NFR BLD AUTO: 67.8 %
PLATELET # BLD AUTO: 279 K/UL
RBC # BLD: 3.4 M/UL
RBC # FLD: 12.6 %
WBC # FLD AUTO: 8.08 K/UL

## 2024-12-24 ENCOUNTER — NON-APPOINTMENT (OUTPATIENT)
Age: 24
End: 2024-12-24

## 2024-12-30 ENCOUNTER — ASOB RESULT (OUTPATIENT)
Age: 24
End: 2024-12-30

## 2024-12-30 ENCOUNTER — NON-APPOINTMENT (OUTPATIENT)
Age: 24
End: 2024-12-30

## 2024-12-30 ENCOUNTER — APPOINTMENT (OUTPATIENT)
Dept: ANTEPARTUM | Facility: CLINIC | Age: 24
End: 2024-12-30
Payer: COMMERCIAL

## 2024-12-30 PROCEDURE — 76816 OB US FOLLOW-UP PER FETUS: CPT

## 2025-01-09 ENCOUNTER — NON-APPOINTMENT (OUTPATIENT)
Age: 25
End: 2025-01-09

## 2025-01-15 ENCOUNTER — APPOINTMENT (OUTPATIENT)
Dept: OBGYN | Facility: CLINIC | Age: 25
End: 2025-01-15
Payer: COMMERCIAL

## 2025-01-15 VITALS
HEIGHT: 64 IN | HEART RATE: 120 BPM | DIASTOLIC BLOOD PRESSURE: 72 MMHG | WEIGHT: 166 LBS | BODY MASS INDEX: 28.34 KG/M2 | SYSTOLIC BLOOD PRESSURE: 121 MMHG

## 2025-01-15 PROCEDURE — 0502F SUBSEQUENT PRENATAL CARE: CPT

## 2025-01-15 RX ORDER — FERROUS SULFATE 137(45) MG
45 TABLET, EXTENDED RELEASE ORAL DAILY
Qty: 30 | Refills: 0 | Status: ACTIVE | COMMUNITY
Start: 2025-01-15

## 2025-01-27 ENCOUNTER — ASOB RESULT (OUTPATIENT)
Age: 25
End: 2025-01-27

## 2025-01-27 ENCOUNTER — APPOINTMENT (OUTPATIENT)
Dept: ANTEPARTUM | Facility: CLINIC | Age: 25
End: 2025-01-27
Payer: COMMERCIAL

## 2025-01-27 PROCEDURE — 76816 OB US FOLLOW-UP PER FETUS: CPT

## 2025-01-27 PROCEDURE — 76820 UMBILICAL ARTERY ECHO: CPT | Mod: 59

## 2025-01-27 PROCEDURE — 76819 FETAL BIOPHYS PROFIL W/O NST: CPT

## 2025-01-28 ENCOUNTER — LABORATORY RESULT (OUTPATIENT)
Age: 25
End: 2025-01-28

## 2025-01-28 ENCOUNTER — NON-APPOINTMENT (OUTPATIENT)
Age: 25
End: 2025-01-28

## 2025-01-28 ENCOUNTER — APPOINTMENT (OUTPATIENT)
Dept: OBGYN | Facility: CLINIC | Age: 25
End: 2025-01-28
Payer: COMMERCIAL

## 2025-01-28 VITALS
HEIGHT: 64 IN | WEIGHT: 167 LBS | HEART RATE: 144 BPM | SYSTOLIC BLOOD PRESSURE: 128 MMHG | BODY MASS INDEX: 28.51 KG/M2 | DIASTOLIC BLOOD PRESSURE: 72 MMHG

## 2025-01-28 DIAGNOSIS — R39.9 UNSPECIFIED SYMPTOMS AND SIGNS INVOLVING THE GENITOURINARY SYSTEM: ICD-10-CM

## 2025-01-28 PROCEDURE — 0502F SUBSEQUENT PRENATAL CARE: CPT

## 2025-01-28 RX ORDER — CEPHALEXIN 500 MG/1
500 TABLET ORAL
Qty: 14 | Refills: 0 | Status: ACTIVE | COMMUNITY
Start: 2025-01-28 | End: 1900-01-01

## 2025-01-31 LAB — BACTERIA UR CULT: NORMAL

## 2025-02-03 ENCOUNTER — ASOB RESULT (OUTPATIENT)
Age: 25
End: 2025-02-03

## 2025-02-03 ENCOUNTER — APPOINTMENT (OUTPATIENT)
Dept: ANTEPARTUM | Facility: CLINIC | Age: 25
End: 2025-02-03
Payer: COMMERCIAL

## 2025-02-03 PROCEDURE — 76820 UMBILICAL ARTERY ECHO: CPT

## 2025-02-03 PROCEDURE — 76819 FETAL BIOPHYS PROFIL W/O NST: CPT

## 2025-02-03 PROCEDURE — 93976 VASCULAR STUDY: CPT

## 2025-02-06 ENCOUNTER — OUTPATIENT (OUTPATIENT)
Dept: OUTPATIENT SERVICES | Facility: HOSPITAL | Age: 25
LOS: 1 days | End: 2025-02-06
Payer: COMMERCIAL

## 2025-02-06 VITALS
SYSTOLIC BLOOD PRESSURE: 130 MMHG | TEMPERATURE: 99 F | RESPIRATION RATE: 22 BRPM | HEART RATE: 126 BPM | DIASTOLIC BLOOD PRESSURE: 76 MMHG

## 2025-02-06 VITALS — DIASTOLIC BLOOD PRESSURE: 67 MMHG | SYSTOLIC BLOOD PRESSURE: 124 MMHG | HEART RATE: 96 BPM | TEMPERATURE: 99 F

## 2025-02-06 DIAGNOSIS — O26.899 OTHER SPECIFIED PREGNANCY RELATED CONDITIONS, UNSPECIFIED TRIMESTER: ICD-10-CM

## 2025-02-06 DIAGNOSIS — Z98.890 OTHER SPECIFIED POSTPROCEDURAL STATES: Chronic | ICD-10-CM

## 2025-02-06 LAB
ANION GAP SERPL CALC-SCNC: 14 MMOL/L — SIGNIFICANT CHANGE UP (ref 5–17)
APPEARANCE UR: ABNORMAL
BACTERIA # UR AUTO: ABNORMAL /HPF
BILIRUB UR-MCNC: NEGATIVE — SIGNIFICANT CHANGE UP
BUN SERPL-MCNC: 5.5 MG/DL — LOW (ref 8–20)
CALCIUM SERPL-MCNC: 8.4 MG/DL — SIGNIFICANT CHANGE UP (ref 8.4–10.5)
CAST: 4 /LPF — SIGNIFICANT CHANGE UP (ref 0–4)
CHLORIDE SERPL-SCNC: 101 MMOL/L — SIGNIFICANT CHANGE UP (ref 96–108)
CO2 SERPL-SCNC: 21 MMOL/L — LOW (ref 22–29)
COLOR SPEC: YELLOW — SIGNIFICANT CHANGE UP
CREAT SERPL-MCNC: 0.54 MG/DL — SIGNIFICANT CHANGE UP (ref 0.5–1.3)
DIFF PNL FLD: NEGATIVE — SIGNIFICANT CHANGE UP
EGFR: 131 ML/MIN/1.73M2 — SIGNIFICANT CHANGE UP
FLUAV H1 2009 PAND RNA SPEC QL NAA+PROBE: DETECTED
FLUAV SUBTYP SPEC NAA+PROBE: DETECTED
GLUCOSE SERPL-MCNC: 89 MG/DL — SIGNIFICANT CHANGE UP (ref 70–99)
GLUCOSE UR QL: NEGATIVE MG/DL — SIGNIFICANT CHANGE UP
KETONES UR-MCNC: 40 MG/DL
LEUKOCYTE ESTERASE UR-ACNC: ABNORMAL
MUCOUS THREADS # UR AUTO: PRESENT
NITRITE UR-MCNC: NEGATIVE — SIGNIFICANT CHANGE UP
PH UR: 7 — SIGNIFICANT CHANGE UP (ref 5–8)
POTASSIUM SERPL-MCNC: 3.6 MMOL/L — SIGNIFICANT CHANGE UP (ref 3.5–5.3)
POTASSIUM SERPL-SCNC: 3.6 MMOL/L — SIGNIFICANT CHANGE UP (ref 3.5–5.3)
PROT UR-MCNC: SIGNIFICANT CHANGE UP MG/DL
RAPID RVP RESULT: DETECTED
RBC CASTS # UR COMP ASSIST: 1 /HPF — SIGNIFICANT CHANGE UP (ref 0–4)
SARS-COV-2 RNA SPEC QL NAA+PROBE: SIGNIFICANT CHANGE UP
SODIUM SERPL-SCNC: 136 MMOL/L — SIGNIFICANT CHANGE UP (ref 135–145)
SP GR SPEC: 1.02 — SIGNIFICANT CHANGE UP (ref 1–1.03)
SQUAMOUS # UR AUTO: 20 /HPF — HIGH (ref 0–5)
UROBILINOGEN FLD QL: 0.2 MG/DL — SIGNIFICANT CHANGE UP (ref 0.2–1)
WBC UR QL: 14 /HPF — HIGH (ref 0–5)

## 2025-02-06 PROCEDURE — 0225U NFCT DS DNA&RNA 21 SARSCOV2: CPT

## 2025-02-06 PROCEDURE — 59025 FETAL NON-STRESS TEST: CPT

## 2025-02-06 PROCEDURE — 81001 URINALYSIS AUTO W/SCOPE: CPT

## 2025-02-06 PROCEDURE — 80048 BASIC METABOLIC PNL TOTAL CA: CPT

## 2025-02-06 PROCEDURE — G0463: CPT

## 2025-02-06 PROCEDURE — 96374 THER/PROPH/DIAG INJ IV PUSH: CPT

## 2025-02-06 PROCEDURE — 36415 COLL VENOUS BLD VENIPUNCTURE: CPT

## 2025-02-06 RX ORDER — ACETAMINOPHEN 160 MG/5ML
1000 SUSPENSION ORAL ONCE
Refills: 0 | Status: COMPLETED | OUTPATIENT
Start: 2025-02-06 | End: 2025-02-06

## 2025-02-06 RX ORDER — ONDANSETRON 4 MG/1
1 TABLET, ORALLY DISINTEGRATING ORAL
Qty: 6 | Refills: 0
Start: 2025-02-06 | End: 2025-02-07

## 2025-02-06 RX ORDER — SODIUM CHLORIDE 9 G/ML
1000 INJECTION, SOLUTION INTRAVENOUS ONCE
Refills: 0 | Status: COMPLETED | OUTPATIENT
Start: 2025-02-06 | End: 2025-02-06

## 2025-02-06 RX ORDER — ONDANSETRON 4 MG/1
4 TABLET, ORALLY DISINTEGRATING ORAL ONCE
Refills: 0 | Status: COMPLETED | OUTPATIENT
Start: 2025-02-06 | End: 2025-02-06

## 2025-02-06 RX ORDER — SODIUM CHLORIDE 9 G/ML
1000 INJECTION, SOLUTION INTRAVENOUS ONCE
Refills: 0 | Status: ACTIVE | OUTPATIENT
Start: 2025-02-06

## 2025-02-06 RX ADMIN — SODIUM CHLORIDE 1000 MILLILITER(S): 9 INJECTION, SOLUTION INTRAVENOUS at 11:45

## 2025-02-06 RX ADMIN — ACETAMINOPHEN 400 MILLIGRAM(S): 160 SUSPENSION ORAL at 11:51

## 2025-02-06 RX ADMIN — ONDANSETRON 4 MILLIGRAM(S): 4 TABLET, ORALLY DISINTEGRATING ORAL at 12:23

## 2025-02-06 NOTE — OB RN TRIAGE NOTE - FALL HARM RISK - UNIVERSAL INTERVENTIONS
Bed in lowest position, wheels locked, appropriate side rails in place/Call bell, personal items and telephone in reach/Instruct patient to call for assistance before getting out of bed or chair/Non-slip footwear when patient is out of bed/[place] to call system/Physically safe environment - no spills, clutter or unnecessary equipment/Purposeful Proactive Rounding/Room/bathroom lighting operational, light cord in reach

## 2025-02-06 NOTE — OB PROVIDER TRIAGE NOTE - NSHPPHYSICALEXAM_GEN_ALL_CORE
T(C): 37.2 (02-06-25 @ 11:25), Max: 37.2 (02-06-25 @ 11:22)  HR: 100 (02-06-25 @ 12:44) (100 - 126)  BP: 111/58 (02-06-25 @ 12:44) (111/58 - 130/76)  RR: 20 (02-06-25 @ 11:25) (20 - 22)  SpO2: --  Gen: NAD, well-appearing  Heart: S1 S2, RRR  Lungs: CTAB  Abd: soft, gravid  Ext: non-edematous, non-tender   SVE: 0/0/-3  SSE: cervix visualized, closed and without any signs of bleeding or drainage, no pooling   FHT: baseline 135, moderate variability, +accels, -decels  [place]: No CTX  BPP: 8/8, vtx, ORA 18.13, anterior placenta

## 2025-02-06 NOTE — OB PROVIDER TRIAGE NOTE - NSOBPROVIDERNOTE_OBGYN_ALL_OB_FT
A/P: CHEMA PITTMAN is a 24y  at 34+2 weeks GA who presents to L&D with a 4 day history of nausea and vomiting, dry cough, fevers, chills and rigors.     VSS - afebrile in triage   Fetus: Reactive  [place]: No CTX  SVE: 0/0/-3  SSE: Cervix closed, normal discharge of pregnancy   BPP: 8/8, vtx, OAR 18.13, anterior placenta    > 1L LR   > IV Ofirmev   > Zofran 4mg IV  > RVP - pending   > BMP - pending     Dispo: Continue to observe.     To be discussed with Dr Marcano A/P: CHEMA PITTMAN is a 24y  at 34+2 weeks GA who presents to L&D with a 4 day history of nausea and vomiting, dry cough, fevers, chills and rigors.     VSS - afebrile in triage   Fetus: Reactive  [place]: No CTX  SVE: 0/0/-3  SSE: Cervix closed, normal discharge of pregnancy   BPP: 8/8, vtx, ORA 18.13, anterior placenta    > 1L LR bolus  > IV Ofirmev   > Zofran 4mg IV  > RVP - pending   > BMP - pending   > Urinalysis w/ reflex culture - pending    Dispo: Continue to observe.     Addendum:  Patient s/p 2 1L LR bolus, IV Zofran and Ofirmev. Feels much better and able to tolerate fluids and solids.   BMP: wnl  RVP-pending - to contact patient if results are positive    Dispo: Home with return precautions. Fetal kick counting discussed. Zofran 4mg 8hrly prn sent to pharmacy, patient advised to cont PO hydration, Tylenol PRN and eating small portions of food every few hours. Patient verbalized understanding and agreement.     Discussed with Dr Marcano

## 2025-02-06 NOTE — OB PROVIDER TRIAGE NOTE - HISTORY OF PRESENT ILLNESS
CHEMA PITTMAN is a 24y  at 34+2 weeks GA who presents to L&D with a 4 day history of nausea and vomiting, dry cough, fevers, chills and rigors. She also states some abdominal pain when she coughs and ambulates. She tested negative for Influenza and Covid-19. Patient also complaining of decreased fetal movement since midnight. Baby is usually very active at night. She currently endorses increased movement since coming to triage. Patient treated with Keflex for a UTI last week.   Pt denies vaginal bleeding, contractions and leakage of fluid.   Pt denies trauma.  Pt denies headaches, visual disturbances, RUQ pain, epigastric pain and new-onset edema.   She denies any urinary complaints.   She denies fevers, chills, nausea, vomiting.   She denies shortness of breath, chest pain, and palpitations.    Pregnancy course is significant for: IUGR - 7%ILE - weekly BPPs and dopplers; Marginal umbilical cord insertion; chronic intractable headaches, nausea and vomiting in  first trimester.    POB:  - TOP in  - elective - D&C  PGYN: -fibroids/-cysts, denied STD hx, abnormal PAP LSIL in pregnancy > colposcopy showed CIN1.  PMH: intractable headaches, childhood asthma,   PSH: Tonsillectomy, adenoidectomy, D&C, endoscopy  SH: Denies tobacco use, EtOH use and illicit drug use during the pregnancy; Smoker/marijuana user prior to pregnancy; Feels safe at home  Meds: PNVs  All: NKDA

## 2025-02-06 NOTE — OB RN TRIAGE NOTE - CHIEF COMPLAINT QUOTE
Flu like symptoms, nausea vominting x2 days, decreased fetal movement. Took one tylenol at 0530. Temp at home was 100.6

## 2025-02-10 ENCOUNTER — APPOINTMENT (OUTPATIENT)
Dept: ANTEPARTUM | Facility: CLINIC | Age: 25
End: 2025-02-10
Payer: COMMERCIAL

## 2025-02-10 ENCOUNTER — ASOB RESULT (OUTPATIENT)
Age: 25
End: 2025-02-10

## 2025-02-10 ENCOUNTER — NON-APPOINTMENT (OUTPATIENT)
Age: 25
End: 2025-02-10

## 2025-02-10 DIAGNOSIS — Z20.822 CONTACT WITH AND (SUSPECTED) EXPOSURE TO COVID-19: ICD-10-CM

## 2025-02-10 DIAGNOSIS — O26.893 OTHER SPECIFIED PREGNANCY RELATED CONDITIONS, THIRD TRIMESTER: ICD-10-CM

## 2025-02-10 DIAGNOSIS — O99.333 SMOKING (TOBACCO) COMPLICATING PREGNANCY, THIRD TRIMESTER: ICD-10-CM

## 2025-02-10 DIAGNOSIS — F12.10 CANNABIS ABUSE, UNCOMPLICATED: ICD-10-CM

## 2025-02-10 DIAGNOSIS — O99.353 DISEASES OF THE NERVOUS SYSTEM COMPLICATING PREGNANCY, THIRD TRIMESTER: ICD-10-CM

## 2025-02-10 DIAGNOSIS — F17.200 NICOTINE DEPENDENCE, UNSPECIFIED, UNCOMPLICATED: ICD-10-CM

## 2025-02-10 DIAGNOSIS — O21.2 LATE VOMITING OF PREGNANCY: ICD-10-CM

## 2025-02-10 DIAGNOSIS — R10.9 UNSPECIFIED ABDOMINAL PAIN: ICD-10-CM

## 2025-02-10 DIAGNOSIS — O43.193 OTHER MALFORMATION OF PLACENTA, THIRD TRIMESTER: ICD-10-CM

## 2025-02-10 DIAGNOSIS — O36.5930 MATERNAL CARE FOR OTHER KNOWN OR SUSPECTED POOR FETAL GROWTH, THIRD TRIMESTER, NOT APPLICABLE OR UNSPECIFIED: ICD-10-CM

## 2025-02-10 DIAGNOSIS — R05.9 COUGH, UNSPECIFIED: ICD-10-CM

## 2025-02-10 DIAGNOSIS — O99.323 DRUG USE COMPLICATING PREGNANCY, THIRD TRIMESTER: ICD-10-CM

## 2025-02-10 DIAGNOSIS — R50.9 FEVER, UNSPECIFIED: ICD-10-CM

## 2025-02-10 DIAGNOSIS — G44.221 CHRONIC TENSION-TYPE HEADACHE, INTRACTABLE: ICD-10-CM

## 2025-02-10 DIAGNOSIS — O36.8130 DECREASED FETAL MOVEMENTS, THIRD TRIMESTER, NOT APPLICABLE OR UNSPECIFIED: ICD-10-CM

## 2025-02-10 PROCEDURE — 76820 UMBILICAL ARTERY ECHO: CPT

## 2025-02-10 PROCEDURE — 76819 FETAL BIOPHYS PROFIL W/O NST: CPT

## 2025-02-11 ENCOUNTER — APPOINTMENT (OUTPATIENT)
Dept: OBGYN | Facility: CLINIC | Age: 25
End: 2025-02-11
Payer: COMMERCIAL

## 2025-02-11 VITALS
BODY MASS INDEX: 28.68 KG/M2 | DIASTOLIC BLOOD PRESSURE: 75 MMHG | WEIGHT: 168 LBS | HEIGHT: 64 IN | SYSTOLIC BLOOD PRESSURE: 124 MMHG | OXYGEN SATURATION: 97 % | HEART RATE: 100 BPM

## 2025-02-11 PROCEDURE — 0502F SUBSEQUENT PRENATAL CARE: CPT

## 2025-02-14 DIAGNOSIS — R39.9 UNSPECIFIED SYMPTOMS AND SIGNS INVOLVING THE GENITOURINARY SYSTEM: ICD-10-CM

## 2025-02-17 ENCOUNTER — APPOINTMENT (OUTPATIENT)
Dept: ANTEPARTUM | Facility: CLINIC | Age: 25
End: 2025-02-17
Payer: COMMERCIAL

## 2025-02-17 ENCOUNTER — ASOB RESULT (OUTPATIENT)
Age: 25
End: 2025-02-17

## 2025-02-17 PROCEDURE — 76816 OB US FOLLOW-UP PER FETUS: CPT

## 2025-02-17 PROCEDURE — 93976 VASCULAR STUDY: CPT

## 2025-02-17 PROCEDURE — 76820 UMBILICAL ARTERY ECHO: CPT | Mod: 59

## 2025-02-17 PROCEDURE — 76819 FETAL BIOPHYS PROFIL W/O NST: CPT

## 2025-02-17 PROCEDURE — 76821 MIDDLE CEREBRAL ARTERY ECHO: CPT | Mod: 59

## 2025-02-18 ENCOUNTER — OUTPATIENT (OUTPATIENT)
Dept: INPATIENT UNIT | Facility: HOSPITAL | Age: 25
LOS: 1 days | End: 2025-02-18
Payer: COMMERCIAL

## 2025-02-18 ENCOUNTER — APPOINTMENT (OUTPATIENT)
Dept: OBGYN | Facility: CLINIC | Age: 25
End: 2025-02-18
Payer: COMMERCIAL

## 2025-02-18 VITALS — TEMPERATURE: 98 F

## 2025-02-18 VITALS
SYSTOLIC BLOOD PRESSURE: 135 MMHG | HEIGHT: 64 IN | WEIGHT: 163 LBS | DIASTOLIC BLOOD PRESSURE: 78 MMHG | BODY MASS INDEX: 27.83 KG/M2 | HEART RATE: 103 BPM

## 2025-02-18 DIAGNOSIS — O21.9 VOMITING OF PREGNANCY, UNSPECIFIED: ICD-10-CM

## 2025-02-18 DIAGNOSIS — R82.998 OTHER ABNORMAL FINDINGS IN URINE: ICD-10-CM

## 2025-02-18 DIAGNOSIS — O36.5990 MATERNAL CARE FOR OTHER KNOWN OR SUSPECTED POOR FETAL GROWTH, UNSPECIFIED TRIMESTER, NOT APPLICABLE OR UNSPECIFIED: ICD-10-CM

## 2025-02-18 DIAGNOSIS — Z98.890 OTHER SPECIFIED POSTPROCEDURAL STATES: Chronic | ICD-10-CM

## 2025-02-18 DIAGNOSIS — O26.899 OTHER SPECIFIED PREGNANCY RELATED CONDITIONS, UNSPECIFIED TRIMESTER: ICD-10-CM

## 2025-02-18 DIAGNOSIS — Z3A.36 36 WEEKS GESTATION OF PREGNANCY: ICD-10-CM

## 2025-02-18 LAB
ALBUMIN SERPL ELPH-MCNC: 3.3 G/DL — SIGNIFICANT CHANGE UP (ref 3.3–5.2)
ALP SERPL-CCNC: 160 U/L — HIGH (ref 40–120)
ALT FLD-CCNC: 9 U/L — SIGNIFICANT CHANGE UP
ANION GAP SERPL CALC-SCNC: 13 MMOL/L — SIGNIFICANT CHANGE UP (ref 5–17)
AST SERPL-CCNC: 19 U/L — SIGNIFICANT CHANGE UP
BILIRUB SERPL-MCNC: 0.3 MG/DL — LOW (ref 0.4–2)
BUN SERPL-MCNC: 5.8 MG/DL — LOW (ref 8–20)
CALCIUM SERPL-MCNC: 9.3 MG/DL — SIGNIFICANT CHANGE UP (ref 8.4–10.5)
CHLORIDE SERPL-SCNC: 99 MMOL/L — SIGNIFICANT CHANGE UP (ref 96–108)
CO2 SERPL-SCNC: 24 MMOL/L — SIGNIFICANT CHANGE UP (ref 22–29)
CREAT SERPL-MCNC: 0.6 MG/DL — SIGNIFICANT CHANGE UP (ref 0.5–1.3)
EGFR: 128 ML/MIN/1.73M2 — SIGNIFICANT CHANGE UP
GLUCOSE SERPL-MCNC: 72 MG/DL — SIGNIFICANT CHANGE UP (ref 70–99)
GLUCOSE UR-MCNC: NORMAL
HCT VFR BLD CALC: 28.3 % — LOW (ref 34.5–45)
HGB BLD-MCNC: 9 G/DL — LOW (ref 11.5–15.5)
LEUKOCYTE ESTERASE UR QL STRIP: NORMAL
MAGNESIUM SERPL-MCNC: 1.6 MG/DL — LOW (ref 1.8–2.6)
MCHC RBC-ENTMCNC: 26.2 PG — LOW (ref 27–34)
MCHC RBC-ENTMCNC: 31.8 G/DL — LOW (ref 32–36)
MCV RBC AUTO: 82.5 FL — SIGNIFICANT CHANGE UP (ref 80–100)
NITRITE UR QL STRIP: NORMAL
NRBC # BLD AUTO: 0 K/UL — SIGNIFICANT CHANGE UP (ref 0–0)
NRBC # FLD: 0 K/UL — SIGNIFICANT CHANGE UP (ref 0–0)
NRBC BLD AUTO-RTO: 0 /100 WBCS — SIGNIFICANT CHANGE UP (ref 0–0)
PHOSPHATE SERPL-MCNC: 3.6 MG/DL — SIGNIFICANT CHANGE UP (ref 2.4–4.7)
PLATELET # BLD AUTO: 343 K/UL — SIGNIFICANT CHANGE UP (ref 150–400)
PMV BLD: 11 FL — SIGNIFICANT CHANGE UP (ref 7–13)
POTASSIUM SERPL-MCNC: 3.6 MMOL/L — SIGNIFICANT CHANGE UP (ref 3.5–5.3)
POTASSIUM SERPL-SCNC: 3.6 MMOL/L — SIGNIFICANT CHANGE UP (ref 3.5–5.3)
PROT SERPL-MCNC: 6.3 G/DL — LOW (ref 6.6–8.7)
PROT UR STRIP-MCNC: NORMAL
RBC # BLD: 3.43 M/UL — LOW (ref 3.8–5.2)
RBC # FLD: 13 % — SIGNIFICANT CHANGE UP (ref 10.3–14.5)
SODIUM SERPL-SCNC: 135 MMOL/L — SIGNIFICANT CHANGE UP (ref 135–145)
WBC # BLD: 8.71 K/UL — SIGNIFICANT CHANGE UP (ref 3.8–10.5)
WBC # FLD AUTO: 8.71 K/UL — SIGNIFICANT CHANGE UP (ref 3.8–10.5)

## 2025-02-18 PROCEDURE — 36415 COLL VENOUS BLD VENIPUNCTURE: CPT

## 2025-02-18 PROCEDURE — G0463: CPT

## 2025-02-18 PROCEDURE — 59025 FETAL NON-STRESS TEST: CPT

## 2025-02-18 PROCEDURE — 0502F SUBSEQUENT PRENATAL CARE: CPT

## 2025-02-18 PROCEDURE — 80053 COMPREHEN METABOLIC PANEL: CPT

## 2025-02-18 PROCEDURE — 96365 THER/PROPH/DIAG IV INF INIT: CPT

## 2025-02-18 PROCEDURE — 84100 ASSAY OF PHOSPHORUS: CPT

## 2025-02-18 PROCEDURE — 85027 COMPLETE CBC AUTOMATED: CPT

## 2025-02-18 PROCEDURE — 96361 HYDRATE IV INFUSION ADD-ON: CPT

## 2025-02-18 PROCEDURE — 83735 ASSAY OF MAGNESIUM: CPT

## 2025-02-18 RX ORDER — PROCHLORPERAZINE 25 MG
5 SUPPOSITORY, RECTAL RECTAL ONCE
Refills: 0 | Status: COMPLETED | OUTPATIENT
Start: 2025-02-18 | End: 2025-02-18

## 2025-02-18 RX ORDER — ONDANSETRON HCL/PF 4 MG/2 ML
1 VIAL (ML) INJECTION
Qty: 56 | Refills: 0
Start: 2025-02-18 | End: 2025-03-03

## 2025-02-18 RX ORDER — ONDANSETRON HCL/PF 4 MG/2 ML
4 VIAL (ML) INJECTION ONCE
Refills: 0 | Status: COMPLETED | OUTPATIENT
Start: 2025-02-18 | End: 2025-02-18

## 2025-02-18 RX ORDER — SODIUM CHLORIDE 9 G/1000ML
500 INJECTION, SOLUTION INTRAVENOUS ONCE
Refills: 0 | Status: COMPLETED | OUTPATIENT
Start: 2025-02-18 | End: 2025-02-18

## 2025-02-18 RX ADMIN — SODIUM CHLORIDE 500 MILLILITER(S): 9 INJECTION, SOLUTION INTRAVENOUS at 16:33

## 2025-02-18 RX ADMIN — Medication 102 MILLIGRAM(S): at 17:53

## 2025-02-18 NOTE — OB PROVIDER TRIAGE NOTE - NSHPPHYSICALEXAM_GEN_ALL_CORE
Vital Signs Last 24 Hrs  T(C): 36.8 (18 Feb 2025 15:57), Max: 36.8 (18 Feb 2025 15:57)  T(F): 98.2 (18 Feb 2025 15:57), Max: 98.2 (18 Feb 2025 15:57)  HR: 78 (18 Feb 2025 16:01) (78 - 78)  BP: 120/65 (18 Feb 2025 16:01) (120/65 - 120/65)  RR: 16 (18 Feb 2025 15:57) (16 - 16)    Parameters below as of 18 Feb 2025 15:57  Patient On (Oxygen Delivery Method): room air    Gen: AAOx3  Abd: soft, gravid  Ext: no tenderness to calf palpation    FHT: baseline 125, moderate variability, + acels, -decels  [place]: CTX q3-5min

## 2025-02-18 NOTE — CONSULT NOTE ADULT - ASSESSMENT
A/P: 25yo  at 36wks who presents form outpatient office for management of nausea and vomiting.     Discussed with Dr. Mock

## 2025-02-18 NOTE — CONSULT NOTE ADULT - PROBLEM SELECTOR RECOMMENDATION 2
Patient with known hx of FGR, last US (2/17) EFW 2235g, 6th %tile, AC 5th %tile with normal umbilical artery dopplers. As per prior [facility] US, delivery recommendation for 38.0-39.0 weeks.

## 2025-02-18 NOTE — CONSULT NOTE ADULT - SUBJECTIVE AND OBJECTIVE BOX
MATERNAL FETAL MEDICINE CONSULT NOTE    25yo  at 36wks who presents form outpatient office for management of nausea and vomiting. Patient endorsing nausea with vomiting x 5 days, endorsing loss of 5 lbs in the past week. Patient unable to keep down solids, however able to tolerate PO water. Endorsing taking occasional zofran but has not taken any today. Denies fevers, sweats, chills, SOB, CP, diarrhea sick contacts. Of note, pt had the flu dx of 24.    KAROL: 3/18/25  LMP: 2024    Pregnancy course complicated by:  1. FGR (EFW 2235g, 6th %tile, AC 5th %tile)  2. Marginal cord insertion    ObHx:  G1: TOP w/ D&C (2024)  GynHx: hx of ALISSA 1, plan to FU pap  PMH: chronic HA  Meds: Iron, PNV  All: NKDA  SurgHx: D&C, tonsillectomy  SocialHx: hx of vaping denies use in pregnancy, denies EtOH and illicit drug use in pregnancy    OBJECTIVE:   VITALS:  T(F): 98.2 (25 @ 15:57), Max: 98.24 (25 @ 15:52)  HR: 78 (25 @ 16:01) (78 - 78)  BP: 120/65 (25 @ 16:01) (120/65 - 120/65)  RR: 16 (25 @ 15:57) (16 - 16)    Gen: AAOx3  Abd: soft, gravid  Ext: no tenderness to calf palpation    FHT: baseline 125, moderate variability, + acels, -decels  [place]: CTX q3-5min    LABS: pending    MEDICATIONS:  lactated ringers Bolus 500 milliLiter(s) IV Bolus once  ondansetron Injectable 4 milliGRAM(s) IV Push once  prochlorperazine   IVPB 5 milliGRAM(s) IV Intermittent once

## 2025-02-18 NOTE — OB PROVIDER TRIAGE NOTE - HISTORY OF PRESENT ILLNESS
23yo  at 36wks who presents form outpatient office for management of nausea and vomiting. Patient endorsing nausea with vomiting x 5 days, endorsing loss of 5 lbs in the past week. Patient unable to keep down solids, however able to tolerate PO water. Endorsing taking occasional zofran but has not taken any today.     KAROL: 3/18/25  LMP: 2024    Pregnancy course complicated by:  1. FGR (EFW 2235g, 6th %tile, AC 5th %tile)  2. Marginal cord insertion    ObHx:  G1: TOP w/ D&C (2024)  GynHx: hx of ALISSA 1, plan to FU pap  PMH: chronic HA  Meds: Iron, PNV  All: NKDA  SurgHx: D&C, tonsillectomy  SocialHx: hx of vaping denies use in pregnancy, denies EtOH and illicit drug use in pregnancy

## 2025-02-18 NOTE — CONSULT NOTE ADULT - PROBLEM SELECTOR RECOMMENDATION 9
Patient sent in from outpatient office for n/v in pregnancy x5 days with 5lb weight loss. Tolerating PO fluids.  - IVF hydration  - IV zofrn & IV compazine  - FU CBC, CMP, Mag and Phos  - PO challenge

## 2025-02-18 NOTE — OB PROVIDER TRIAGE NOTE - NSOBPROVIDERNOTE_OBGYN_ALL_OB_FT
A/P: 23yo  at 36wks who presents form outpatient office for management of nausea and vomiting.    - VSS  - FHT reassuring  - FU CBC, BMP, Mag, phos  - IVF hydration  - IV zofran, compazine    Discussed with Dr. Marcano A/P: 25yo  at 36wks who presents form outpatient office for management of nausea and vomiting.    - VSS  - FHT reassuring  - FU CBC, BMP, Mag, phos  - IVF hydration  - IV zofran, compazine    Discussed with Dr. Marcano    - CBC, BMP, mg, phos within normal limits  - Tolerated tray after zofran, compazine.   - Sent more disintegrating Zofran  - Discussed nausea, vomiting and labor return precautions    D/w Dr. Marcano

## 2025-02-19 LAB — T PALLIDUM AB SER QL IA: NEGATIVE

## 2025-02-20 ENCOUNTER — APPOINTMENT (OUTPATIENT)
Dept: ANTEPARTUM | Facility: CLINIC | Age: 25
End: 2025-02-20

## 2025-02-20 DIAGNOSIS — O36.5930 MATERNAL CARE FOR OTHER KNOWN OR SUSPECTED POOR FETAL GROWTH, THIRD TRIMESTER, NOT APPLICABLE OR UNSPECIFIED: ICD-10-CM

## 2025-02-20 DIAGNOSIS — Z3A.36 36 WEEKS GESTATION OF PREGNANCY: ICD-10-CM

## 2025-02-20 DIAGNOSIS — O21.2 LATE VOMITING OF PREGNANCY: ICD-10-CM

## 2025-02-21 LAB
BACTERIA UR CULT: NORMAL
GP B STREP DNA SPEC QL NAA+PROBE: NOT DETECTED
SOURCE GBS: NORMAL

## 2025-02-24 ENCOUNTER — APPOINTMENT (OUTPATIENT)
Dept: OBGYN | Facility: CLINIC | Age: 25
End: 2025-02-24
Payer: COMMERCIAL

## 2025-02-24 ENCOUNTER — APPOINTMENT (OUTPATIENT)
Dept: ANTEPARTUM | Facility: CLINIC | Age: 25
End: 2025-02-24
Payer: COMMERCIAL

## 2025-02-24 ENCOUNTER — ASOB RESULT (OUTPATIENT)
Age: 25
End: 2025-02-24

## 2025-02-24 VITALS
HEART RATE: 120 BPM | HEIGHT: 64 IN | WEIGHT: 165 LBS | BODY MASS INDEX: 28.17 KG/M2 | SYSTOLIC BLOOD PRESSURE: 116 MMHG | DIASTOLIC BLOOD PRESSURE: 73 MMHG

## 2025-02-24 PROCEDURE — 76821 MIDDLE CEREBRAL ARTERY ECHO: CPT

## 2025-02-24 PROCEDURE — 76819 FETAL BIOPHYS PROFIL W/O NST: CPT

## 2025-02-24 PROCEDURE — 76820 UMBILICAL ARTERY ECHO: CPT

## 2025-02-24 PROCEDURE — 93976 VASCULAR STUDY: CPT

## 2025-02-24 PROCEDURE — 0502F SUBSEQUENT PRENATAL CARE: CPT

## 2025-02-27 ENCOUNTER — APPOINTMENT (OUTPATIENT)
Dept: ANTEPARTUM | Facility: CLINIC | Age: 25
End: 2025-02-27

## 2025-02-28 RX ORDER — OXYTOCIN-SODIUM CHLORIDE 0.9% IV SOLN 30 UNIT/500ML 30-0.9/5 UT/ML-%
167 SOLUTION INTRAVENOUS
Qty: 30 | Refills: 0 | Status: DISCONTINUED | OUTPATIENT
Start: 2025-03-05 | End: 2025-03-08

## 2025-02-28 RX ORDER — SODIUM CHLORIDE 9 G/1000ML
1000 INJECTION, SOLUTION INTRAVENOUS
Refills: 0 | Status: DISCONTINUED | OUTPATIENT
Start: 2025-03-05 | End: 2025-03-07

## 2025-02-28 RX ORDER — CITRIC ACID/SODIUM CITRATE 300-500 MG
30 SOLUTION, ORAL ORAL ONCE
Refills: 0 | Status: COMPLETED | OUTPATIENT
Start: 2025-03-05 | End: 2025-03-05

## 2025-03-03 ENCOUNTER — APPOINTMENT (OUTPATIENT)
Dept: ANTEPARTUM | Facility: CLINIC | Age: 25
End: 2025-03-03
Payer: COMMERCIAL

## 2025-03-03 ENCOUNTER — APPOINTMENT (OUTPATIENT)
Dept: ANTEPARTUM | Facility: CLINIC | Age: 25
End: 2025-03-03

## 2025-03-03 ENCOUNTER — ASOB RESULT (OUTPATIENT)
Age: 25
End: 2025-03-03

## 2025-03-03 PROCEDURE — 76819 FETAL BIOPHYS PROFIL W/O NST: CPT

## 2025-03-03 PROCEDURE — 76821 MIDDLE CEREBRAL ARTERY ECHO: CPT

## 2025-03-03 PROCEDURE — 76820 UMBILICAL ARTERY ECHO: CPT

## 2025-03-03 PROCEDURE — 93976 VASCULAR STUDY: CPT

## 2025-03-05 ENCOUNTER — RESULT REVIEW (OUTPATIENT)
Age: 25
End: 2025-03-05

## 2025-03-05 ENCOUNTER — INPATIENT (INPATIENT)
Facility: HOSPITAL | Age: 25
LOS: 2 days | Discharge: HOME | End: 2025-03-08
Attending: OBSTETRICS & GYNECOLOGY | Admitting: OBSTETRICS & GYNECOLOGY
Payer: COMMERCIAL

## 2025-03-05 VITALS — TEMPERATURE: 98 F

## 2025-03-05 DIAGNOSIS — Z98.890 OTHER SPECIFIED POSTPROCEDURAL STATES: Chronic | ICD-10-CM

## 2025-03-05 DIAGNOSIS — O36.59 MATERNAL CARE FOR OTHER KNOWN OR SUSPECTED POOR FETAL GROWTH: ICD-10-CM

## 2025-03-05 LAB
BASOPHILS # BLD AUTO: 0.03 K/UL — SIGNIFICANT CHANGE UP (ref 0–0.2)
BASOPHILS NFR BLD AUTO: 0.3 % — SIGNIFICANT CHANGE UP (ref 0–2)
BLD GP AB SCN SERPL QL: SIGNIFICANT CHANGE UP
EOSINOPHIL # BLD AUTO: 0.09 K/UL — SIGNIFICANT CHANGE UP (ref 0–0.5)
EOSINOPHIL NFR BLD AUTO: 1 % — SIGNIFICANT CHANGE UP (ref 0–6)
HCT VFR BLD CALC: 24.6 % — LOW (ref 34.5–45)
HGB BLD-MCNC: 8 G/DL — LOW (ref 11.5–15.5)
HIV 1 & 2 AB SERPL IA.RAPID: SIGNIFICANT CHANGE UP
IMM GRANULOCYTES # BLD AUTO: 0.08 K/UL — HIGH (ref 0–0.07)
IMM GRANULOCYTES NFR BLD AUTO: 0.9 % — SIGNIFICANT CHANGE UP (ref 0–0.9)
LYMPHOCYTES # BLD AUTO: 2.34 K/UL — SIGNIFICANT CHANGE UP (ref 1–3.3)
LYMPHOCYTES NFR BLD AUTO: 25.4 % — SIGNIFICANT CHANGE UP (ref 13–44)
MCHC RBC-ENTMCNC: 25.7 PG — LOW (ref 27–34)
MCHC RBC-ENTMCNC: 32.5 G/DL — SIGNIFICANT CHANGE UP (ref 32–36)
MCV RBC AUTO: 79.1 FL — LOW (ref 80–100)
MONOCYTES # BLD AUTO: 0.96 K/UL — HIGH (ref 0–0.9)
MONOCYTES NFR BLD AUTO: 10.4 % — SIGNIFICANT CHANGE UP (ref 2–14)
NEUTROPHILS # BLD AUTO: 5.73 K/UL — SIGNIFICANT CHANGE UP (ref 1.8–7.4)
NEUTROPHILS NFR BLD AUTO: 62 % — SIGNIFICANT CHANGE UP (ref 43–77)
NRBC # BLD AUTO: 0 K/UL — SIGNIFICANT CHANGE UP (ref 0–0)
NRBC # FLD: 0 K/UL — SIGNIFICANT CHANGE UP (ref 0–0)
NRBC BLD AUTO-RTO: 0 /100 WBCS — SIGNIFICANT CHANGE UP (ref 0–0)
PLATELET # BLD AUTO: 247 K/UL — SIGNIFICANT CHANGE UP (ref 150–400)
PMV BLD: 11.6 FL — SIGNIFICANT CHANGE UP (ref 7–13)
RBC # BLD: 3.11 M/UL — LOW (ref 3.8–5.2)
RBC # FLD: 13.7 % — SIGNIFICANT CHANGE UP (ref 10.3–14.5)
WBC # BLD: 9.23 K/UL — SIGNIFICANT CHANGE UP (ref 3.8–10.5)
WBC # FLD AUTO: 9.23 K/UL — SIGNIFICANT CHANGE UP (ref 3.8–10.5)

## 2025-03-05 PROCEDURE — 59400 OBSTETRICAL CARE: CPT

## 2025-03-05 PROCEDURE — 88307 TISSUE EXAM BY PATHOLOGIST: CPT | Mod: 26

## 2025-03-05 RX ORDER — DINOPROSTONE 10 MG/241MG
10 INSERT VAGINAL ONCE
Refills: 0 | Status: COMPLETED | OUTPATIENT
Start: 2025-03-05 | End: 2025-03-05

## 2025-03-05 RX ADMIN — DINOPROSTONE 10 MILLIGRAM(S): 10 INSERT VAGINAL at 23:01

## 2025-03-05 RX ADMIN — SODIUM CHLORIDE 125 MILLILITER(S): 9 INJECTION, SOLUTION INTRAVENOUS at 20:30

## 2025-03-05 NOTE — OB PROVIDER H&P - HISTORY OF PRESENT ILLNESS
Patient is a 24 year old  at 38w1d by LMP who presents to L&D for IOL for FGR.     KAROL:  3/18/25   LMP: 24   Pregnancy course: poor fetal growth (EFW 6%, AC 5%), marginal cord insertion   Obhx: G1: 2024 TOP at 14wks   Gynhx: denies fibroids, cysts, STIs, history of CIN1 s/p colpo   Pmhx: chronic headaches   Pshx: tonsillectomy, D&C   Meds: PNV, PO iron (does not take daily), fioricet (no longer taking)   Allergies: NKDA   Social Hx: denies current tobacco/during pregnancy (vaped for 6 years daily throughout the day prior to pregnancy), denies recreational drug, alcohol use during pregnancy. Feels safe at home     Ultrasound:  cephalic, anterior placenta   EFW:  2235gm, 6%  Patient is a 24 year old  at 38w1d by LMP who presents to L&D for IOL for FGR. Denies LOF, VB, CTX, +FM.     KAROL:  3/18/25   LMP: 24     Pregnancy course: poor fetal growth (EFW 6%, AC 5%), marginal cord insertion     Obhx: G1: 2024 TOP at 14wks   Gynhx: denies fibroids, cysts, STIs, history of CIN1 s/p colpo   Pmhx: chronic headaches   Pshx: tonsillectomy, D&C   Meds: PNV, PO iron (does not take daily), fioricet (no longer taking)   Allergies: NKDA   Social Hx: denies current tobacco/during pregnancy (vaped for 6 years daily throughout the day prior to pregnancy), denies recreational drug, alcohol use during pregnancy. Feels safe at home     Ultrasound:  cephalic, anterior placenta   EFW:  2235gm, 6%

## 2025-03-05 NOTE — OB PROVIDER H&P - ASSESSMENT
A/P:  24 year old  at 38w1d by LMP who presents to L&D for IOL for FGR.     -Admit to L&D  -Consent  -IOl with cervidil, placed at 2300   -Admission labs  -IV fluids  -Fetus: Cat I tracing. Continuous toco and fetal monitoring.   -GBS: Negative, no GBS ppx required   -Analgesia: epidural at patient request     Discussed with Dr. Marcano    Signed: Marissa Dotson MD (PGY1)

## 2025-03-05 NOTE — OB PROVIDER H&P - NSHPPHYSICALEXAM_GEN_ALL_CORE
T(C): 36.6 (03-05-25 @ 19:51), Max: 36.6 (03-05-25 @ 19:51)  HR: 96 (03-05-25 @ 19:54) (96 - 96)  BP: 119/75 (03-05-25 @ 19:54) (119/75 - 119/75)  RR: 18 (03-05-25 @ 19:51) (18 - 18)  SpO2: --    Gen: NAD, well-appearing  Abd: soft, gravid  Ext: non-edematous, non-tender   FHT: Baseline 135, moderate variability, accels present, no decels  [place]: No contractions T(C): 36.6 (03-05-25 @ 19:51), Max: 36.6 (03-05-25 @ 19:51)  HR: 96 (03-05-25 @ 19:54) (96 - 96)  BP: 119/75 (03-05-25 @ 19:54) (119/75 - 119/75)  RR: 18 (03-05-25 @ 19:51) (18 - 18)  SpO2: --    Gen: NAD, well-appearing  Abd: soft, gravid  Ext: non-edematous, non-tender   FHT: Baseline 135, moderate variability, accels present, no decels  SVE: 0/0/-3  [place]: No contractions

## 2025-03-05 NOTE — OB PROVIDER H&P - NSHPSOCIALHISTORY_GEN_ALL_CORE
Social Hx: denies current tobacco/during pregnancy (vaped for 6 years daily throughout the day prior to pregnancy), denies recreational drug, alcohol use during pregnancy. Feels safe at home

## 2025-03-05 NOTE — OB PROVIDER H&P - NSLOWPPHRISK_OBGYN_A_OB
No previous uterine incision/[place] Pregnancy/Less than or equal to 4 previous vaginal births/No known bleeding disorder/No history of postpartum hemorrhage/No other PPH risks indicated

## 2025-03-06 ENCOUNTER — APPOINTMENT (OUTPATIENT)
Dept: ANTEPARTUM | Facility: CLINIC | Age: 25
End: 2025-03-06

## 2025-03-06 ENCOUNTER — NON-APPOINTMENT (OUTPATIENT)
Age: 25
End: 2025-03-06

## 2025-03-06 LAB
GLUCOSE BLDC GLUCOMTR-MCNC: 89 MG/DL — SIGNIFICANT CHANGE UP (ref 70–99)
HCT VFR BLD CALC: 25.6 % — LOW (ref 34.5–45)
HGB BLD-MCNC: 8.2 G/DL — LOW (ref 11.5–15.5)
HIV 1+2 AB+HIV1 P24 AG SERPL QL IA: SIGNIFICANT CHANGE UP
MCHC RBC-ENTMCNC: 25.4 PG — LOW (ref 27–34)
MCHC RBC-ENTMCNC: 32 G/DL — SIGNIFICANT CHANGE UP (ref 32–36)
MCV RBC AUTO: 79.3 FL — LOW (ref 80–100)
NRBC # BLD AUTO: 0 K/UL — SIGNIFICANT CHANGE UP (ref 0–0)
NRBC # FLD: 0 K/UL — SIGNIFICANT CHANGE UP (ref 0–0)
NRBC BLD AUTO-RTO: 0 /100 WBCS — SIGNIFICANT CHANGE UP (ref 0–0)
PLATELET # BLD AUTO: 237 K/UL — SIGNIFICANT CHANGE UP (ref 150–400)
PMV BLD: 11.3 FL — SIGNIFICANT CHANGE UP (ref 7–13)
RBC # BLD: 3.23 M/UL — LOW (ref 3.8–5.2)
RBC # FLD: 13.8 % — SIGNIFICANT CHANGE UP (ref 10.3–14.5)
T PALLIDUM AB TITR SER: NEGATIVE — SIGNIFICANT CHANGE UP
WBC # BLD: 7.92 K/UL — SIGNIFICANT CHANGE UP (ref 3.8–10.5)
WBC # FLD AUTO: 7.92 K/UL — SIGNIFICANT CHANGE UP (ref 3.8–10.5)

## 2025-03-06 RX ORDER — SODIUM CHLORIDE 9 G/1000ML
1000 INJECTION, SOLUTION INTRAVENOUS
Refills: 0 | Status: DISCONTINUED | OUTPATIENT
Start: 2025-03-06 | End: 2025-03-08

## 2025-03-06 RX ORDER — MAGNESIUM SULFATE 500 MG/ML
2 SYRINGE (ML) INJECTION ONCE
Refills: 0 | Status: COMPLETED | OUTPATIENT
Start: 2025-03-06 | End: 2025-03-06

## 2025-03-06 RX ORDER — OXYTOCIN-SODIUM CHLORIDE 0.9% IV SOLN 30 UNIT/500ML 30-0.9/5 UT/ML-%
SOLUTION INTRAVENOUS
Qty: 30 | Refills: 0 | Status: DISCONTINUED | OUTPATIENT
Start: 2025-03-06 | End: 2025-03-07

## 2025-03-06 RX ADMIN — OXYTOCIN-SODIUM CHLORIDE 0.9% IV SOLN 30 UNIT/500ML 2 MILLIUNIT(S)/MIN: 30-0.9/5 SOLUTION at 10:03

## 2025-03-06 RX ADMIN — SODIUM CHLORIDE 125 MILLILITER(S): 9 INJECTION, SOLUTION INTRAVENOUS at 16:38

## 2025-03-06 RX ADMIN — SODIUM CHLORIDE 125 MILLILITER(S): 9 INJECTION, SOLUTION INTRAVENOUS at 00:30

## 2025-03-06 RX ADMIN — Medication 25 GRAM(S): at 11:38

## 2025-03-06 RX ADMIN — SODIUM CHLORIDE 125 MILLILITER(S): 9 INJECTION, SOLUTION INTRAVENOUS at 08:36

## 2025-03-06 RX ADMIN — Medication 1 APPLICATION(S): at 12:24

## 2025-03-06 NOTE — OB RN DELIVERY SUMMARY - NS_EXTRAMURALDEL_OBGYN_ALL_OB
Anesthesia Evaluation     . Pt has not had prior anesthetic            ROS/MED HX    ENT/Pulmonary:      (-) asthma and sleep apnea   Neurologic: Comment: BPPV      Cardiovascular: Comment: Study Date: 09/20/2016 10:24 AM  Age: 67 yrs  Gender: Male  Patient Location: [facility]  Reason For Study: Hypertensive Heart Disease  Ordering Physician: LOUISE COUGHLIN  Referring Physician: none  Performed By: Felicita Young RDCS     BSA: 1.6 m2  Height: 62 in  Weight: 135 lb  HR: 73  BP: 118/82 mmHg  ______________________________________________________________________________        Procedure  Complete Portable Echo Adult. Contrast Lumason.  ______________________________________________________________________________     Interpretation Summary     Left ventricular systolic function is normal.  No regional wall motion abnormalities noted.  The ascending aorta is Mildly dilated.  ______________________________________________________________________________           Left Ventricle  The left ventricle is normal in size. There is mild concentric left  ventricular hypertrophy. Left ventricular systolic function is normal. The  visual ejection fraction is estimated at 60-65%. Grade I or early diastolic  dysfunction. E by E prime ratio is greater than 15, that likely suggests  increased left ventricular filling pressures. No regional wall motion  abnormalities noted.     Right Ventricle  The right ventricle is borderline dilated. The right ventricle is not well  visualized.  Atria  Normal left atrial size. Right atrial size is normal. There is no atrial  shunt seen.     Mitral Valve  There is mild to moderate mitral annular calcification. There is trace mitral  regurgitation.     Tricuspid Valve  The tricuspid valve is not well visualized, but is grossly normal. There is  mild to moderate (1-2+) tricuspid regurgitation. The right ventricular  systolic pressure is approximated at 21.1 mmHg plus the right atrial  pressure. Normal IVC  No (1.5-2.5cm) with >50% respiratory collapse; right atrial  pressure is estimated at 5-10mmHg.     Aortic Valve  The aortic valve is trileaflet. No aortic regurgitation is present. No  hemodynamically significant valvular aortic stenosis.     Pulmonic Valve  The pulmonic valve is not well visualized. There is mild (1+) pulmonic  valvular regurgitation. Normal pulmonic valve velocity.     Vessels  The aortic root is normal size. The ascending aorta is Mildly dilated.  Inferior vena cava not well visualized for estimation of right atrial  pressure. The IVC is normal in size and reactivity with respiration,  suggesting normal central venous pressure.  Pericardium  There is no pericardial effusion.     Rhythm  The rhythm was normal sinus.    (+) Dyslipidemia, hypertension----. : . . . :. .      (-) HARTMAN   METS/Exercise Tolerance:  >4 METS   Hematologic:         Musculoskeletal:         GI/Hepatic:        (-) GERD   Renal/Genitourinary:         Endo: Comment: Pre DM        Psychiatric:         Infectious Disease:         Malignancy:   (+) Malignancy History of GI  GI CA Active status post,         Other:                     Physical Exam      Airway   Mallampati: II  TM distance: >3 FB  Neck ROM: full    Dental   (+) loose, missing and chipped    Cardiovascular   Rhythm and rate: regular      Pulmonary    breath sounds clear to auscultation                        Anesthesia Plan      History & Physical Review  History and physical reviewed and following examination; no interval change.    ASA Status:  3 .        Plan for MAC   PONV prophylaxis:  Ondansetron (or other 5HT-3)       Postoperative Care      Consents  Anesthetic plan, risks, benefits and alternatives discussed with:  Patient..                          .  Procedure: Procedure(s):  LAPAROSCOPIC ASSISTED SIGMOID COLECTOMY  Preop diagnosis: COLON CANCER    No Known Allergies  Past Medical History:   Diagnosis Date     BPPV (benign paroxysmal positional vertigo)       Colon cancer (H)      FH: colon cancer      Heart disease      HLD (hyperlipidemia)      Hypertension      Prediabetes      Past Surgical History:   Procedure Laterality Date     COLONOSCOPY N/A 4/24/2017    Procedure: COMBINED COLONOSCOPY, SINGLE OR MULTIPLE BIOPSY/POLYPECTOMY BY BIOPSY;  colonoscopy;  Surgeon: Estrella Epstein MD;  Location:  GI     COLONOSCOPY N/A 6/1/2017    Procedure: COLONOSCOPY;  Tattoo submucosal injection ;  Surgeon: Darwin Lowry MD;  Location:  OR     LAPAROSCOPIC ASSISTED SIGMOID COLECTOMY N/A 6/1/2017    Procedure: LAPAROSCOPIC ASSISTED SIGMOID COLECTOMY;  INTRAOP COLONOSCOPY WITH TATTOOING/ LAPAROSCOPIC ASSISTED LEFT COLECTOMY ;  Surgeon: Darwin Lowry MD;  Location:  OR     Prior to Admission medications    Medication Sig Start Date End Date Taking? Authorizing Provider   amLODIPine-benazepril (LOTREL) 10-20 MG per capsule Take 1 capsule by mouth daily   Yes Reported, Patient     No current Epic-ordered facility-administered medications on file.      No current Epic-ordered outpatient prescriptions on file.     Wt Readings from Last 1 Encounters:   06/29/17 49.9 kg (110 lb)     Temp Readings from Last 1 Encounters:   06/29/17 35.4  C (95.7  F) (Oral)     BP Readings from Last 6 Encounters:   06/29/17 122/71   06/27/17 115/78   06/05/17 (!) 128/94   04/24/17 134/90   09/20/16 118/82     Pulse Readings from Last 4 Encounters:   06/29/17 74   06/27/17 72   06/05/17 78   09/19/16 84     Resp Readings from Last 1 Encounters:   06/29/17 18     SpO2 Readings from Last 1 Encounters:   06/29/17 99%     Recent Labs   Lab Test  09/19/16   2305  09/19/16   1255   NA  138  136   POTASSIUM  4.0  3.7   CHLORIDE  105  101   CO2  26  27   ANIONGAP  7  8   GLC  118*  149*   BUN  12  16   CR  0.94  1.00   ALBER  8.2*  8.4*     Recent Labs   Lab Test  09/19/16   1255   WBC  9.5   HGB  16.0   PLT  197     No results for input(s): INR in the last 92566 hours.    Invalid input(s): APTT    RECENT LABS:   ECG:   ECHO:   CXR:

## 2025-03-06 NOTE — OB RN DELIVERY SUMMARY - NS_SEPSISRSKCALC_OBGYN_ALL_OB_FT
EOS calculated successfully. EOS Risk Factor: 0.5/1000 live births ([facility] national incidence); GA=38w3d; Temp=98.24; ROM=6.933; GBS='Negative'; Antibiotics='No antibiotics or any antibiotics < 2 hrs prior to birth'

## 2025-03-06 NOTE — OB PROVIDER IHI INDUCTION/AUGMENTATION NOTE - NS_FETALPRESENTATIONA_OBGYN_ALL_OB
Cardiology documentation  Awake alert no complaints blood pressure 118/61 JVP normal auscultation of the heart S1-S2 normal lungs rhonchi abdomen soft no pedal edema    Clinical impression  1. Persistent atrial fibrillation  2. Mechanical fall with intracranial bleeding  3. Troponin elevation from supply demand mismatch due to intracranial bleeding  4. Status post pacemaker insertion  5. Status post aortic valve replacem ent    Recommendations  Cardiac status stable 
Cephalic
Cephalic

## 2025-03-06 NOTE — OB RN DELIVERY SUMMARY - NSSELHIDDEN_OBGYN_ALL_OB_FT
[NS_DeliveryAttending1_OBGYN_ALL_OB_FT:[id number]],[NS_DeliveryAssist1_OBGYN_ALL_OB_FT:[id number]=],[NS_DeliveryRN_OBGYN_ALL_OB_FT:[id number]=]

## 2025-03-07 ENCOUNTER — NON-APPOINTMENT (OUTPATIENT)
Age: 25
End: 2025-03-07

## 2025-03-07 LAB
HCT VFR BLD CALC: 24.1 % — LOW (ref 34.5–45)
HGB BLD-MCNC: 7.9 G/DL — LOW (ref 11.5–15.5)
MCHC RBC-ENTMCNC: 26.2 PG — LOW (ref 27–34)
MCHC RBC-ENTMCNC: 32.8 G/DL — SIGNIFICANT CHANGE UP (ref 32–36)
MCV RBC AUTO: 79.8 FL — LOW (ref 80–100)
NRBC # BLD AUTO: 0.02 K/UL — HIGH (ref 0–0)
NRBC # FLD: 0.02 K/UL — HIGH (ref 0–0)
NRBC BLD AUTO-RTO: 0 /100 WBCS — SIGNIFICANT CHANGE UP (ref 0–0)
PLATELET # BLD AUTO: 212 K/UL — SIGNIFICANT CHANGE UP (ref 150–400)
PMV BLD: 11.4 FL — SIGNIFICANT CHANGE UP (ref 7–13)
RBC # BLD: 3.02 M/UL — LOW (ref 3.8–5.2)
RBC # FLD: 13.7 % — SIGNIFICANT CHANGE UP (ref 10.3–14.5)
WBC # BLD: 16.95 K/UL — HIGH (ref 3.8–10.5)
WBC # FLD AUTO: 16.95 K/UL — HIGH (ref 3.8–10.5)

## 2025-03-07 RX ORDER — DIBUCAINE 10 MG/G
1 OINTMENT TOPICAL EVERY 6 HOURS
Refills: 0 | Status: DISCONTINUED | OUTPATIENT
Start: 2025-03-07 | End: 2025-03-08

## 2025-03-07 RX ORDER — HYDROCORTISONE 10 MG/G
1 CREAM TOPICAL EVERY 6 HOURS
Refills: 0 | Status: DISCONTINUED | OUTPATIENT
Start: 2025-03-07 | End: 2025-03-08

## 2025-03-07 RX ORDER — DIPHENHYDRAMINE HCL 12.5MG/5ML
25 ELIXIR ORAL EVERY 6 HOURS
Refills: 0 | Status: DISCONTINUED | OUTPATIENT
Start: 2025-03-07 | End: 2025-03-08

## 2025-03-07 RX ORDER — KETOROLAC TROMETHAMINE 30 MG/ML
30 INJECTION, SOLUTION INTRAMUSCULAR; INTRAVENOUS ONCE
Refills: 0 | Status: DISCONTINUED | OUTPATIENT
Start: 2025-03-07 | End: 2025-03-07

## 2025-03-07 RX ORDER — WITCH HAZEL LEAF
1 FLUID EXTRACT MISCELLANEOUS EVERY 4 HOURS
Refills: 0 | Status: DISCONTINUED | OUTPATIENT
Start: 2025-03-07 | End: 2025-03-08

## 2025-03-07 RX ORDER — OXYCODONE HYDROCHLORIDE 30 MG/1
5 TABLET ORAL ONCE
Refills: 0 | Status: DISCONTINUED | OUTPATIENT
Start: 2025-03-07 | End: 2025-03-08

## 2025-03-07 RX ORDER — CLOSTRIDIUM TETANI TOXOID ANTIGEN (FORMALDEHYDE INACTIVATED), CORYNEBACTERIUM DIPHTHERIAE TOXOID ANTIGEN (FORMALDEHYDE INACTIVATED), BORDETELLA PERTUSSIS TOXOID ANTIGEN (GLUTARALDEHYDE INACTIVATED), BORDETELLA PERTUSSIS FILAMENTOUS HEMAGGLUTININ ANTIGEN (FORMALDEHYDE INACTIVATED), BORDETELLA PERTUSSIS PERTACTIN ANTIGEN, AND BORDETELLA PERTUSSIS FIMBRIAE 2/3 ANTIGEN 5; 2; 2.5; 5; 3; 5 [LF]/.5ML; [LF]/.5ML; UG/.5ML; UG/.5ML; UG/.5ML; UG/.5ML
0.5 INJECTION, SUSPENSION INTRAMUSCULAR ONCE
Refills: 0 | Status: DISCONTINUED | OUTPATIENT
Start: 2025-03-07 | End: 2025-03-08

## 2025-03-07 RX ORDER — OXYCODONE HYDROCHLORIDE 30 MG/1
5 TABLET ORAL
Refills: 0 | Status: DISCONTINUED | OUTPATIENT
Start: 2025-03-07 | End: 2025-03-08

## 2025-03-07 RX ORDER — ACETAMINOPHEN 500 MG/5ML
975 LIQUID (ML) ORAL
Refills: 0 | Status: DISCONTINUED | OUTPATIENT
Start: 2025-03-07 | End: 2025-03-08

## 2025-03-07 RX ORDER — IBUPROFEN 200 MG
600 TABLET ORAL EVERY 6 HOURS
Refills: 0 | Status: DISCONTINUED | OUTPATIENT
Start: 2025-03-07 | End: 2025-03-08

## 2025-03-07 RX ORDER — MAGNESIUM HYDROXIDE 400 MG/5ML
30 SUSPENSION ORAL
Refills: 0 | Status: DISCONTINUED | OUTPATIENT
Start: 2025-03-07 | End: 2025-03-08

## 2025-03-07 RX ORDER — MODIFIED LANOLIN 100 %
1 CREAM (GRAM) TOPICAL EVERY 6 HOURS
Refills: 0 | Status: DISCONTINUED | OUTPATIENT
Start: 2025-03-07 | End: 2025-03-08

## 2025-03-07 RX ORDER — PRENATAL 136/IRON/FOLIC ACID 27 MG-1 MG
1 TABLET ORAL DAILY
Refills: 0 | Status: DISCONTINUED | OUTPATIENT
Start: 2025-03-07 | End: 2025-03-08

## 2025-03-07 RX ORDER — PRAMOXINE HCL 1 %
1 GEL (GRAM) TOPICAL EVERY 4 HOURS
Refills: 0 | Status: DISCONTINUED | OUTPATIENT
Start: 2025-03-07 | End: 2025-03-08

## 2025-03-07 RX ORDER — SIMETHICONE 80 MG
80 TABLET,CHEWABLE ORAL EVERY 4 HOURS
Refills: 0 | Status: DISCONTINUED | OUTPATIENT
Start: 2025-03-07 | End: 2025-03-08

## 2025-03-07 RX ORDER — IBUPROFEN 200 MG
600 TABLET ORAL EVERY 6 HOURS
Refills: 0 | Status: COMPLETED | OUTPATIENT
Start: 2025-03-07 | End: 2026-02-03

## 2025-03-07 RX ORDER — BENZOCAINE 220 MG/G
1 SPRAY, METERED PERIODONTAL EVERY 6 HOURS
Refills: 0 | Status: DISCONTINUED | OUTPATIENT
Start: 2025-03-07 | End: 2025-03-08

## 2025-03-07 RX ORDER — OXYTOCIN-SODIUM CHLORIDE 0.9% IV SOLN 30 UNIT/500ML 30-0.9/5 UT/ML-%
167 SOLUTION INTRAVENOUS
Qty: 30 | Refills: 0 | Status: DISCONTINUED | OUTPATIENT
Start: 2025-03-07 | End: 2025-03-08

## 2025-03-07 RX ADMIN — Medication 3 MILLILITER(S): at 22:08

## 2025-03-07 RX ADMIN — Medication 975 MILLIGRAM(S): at 16:03

## 2025-03-07 RX ADMIN — SODIUM CHLORIDE 125 MILLILITER(S): 9 INJECTION, SOLUTION INTRAVENOUS at 00:38

## 2025-03-07 RX ADMIN — Medication 975 MILLIGRAM(S): at 22:25

## 2025-03-07 RX ADMIN — Medication 3 MILLILITER(S): at 05:24

## 2025-03-07 RX ADMIN — Medication 975 MILLIGRAM(S): at 09:15

## 2025-03-07 RX ADMIN — Medication 1 TABLET(S): at 13:07

## 2025-03-07 RX ADMIN — KETOROLAC TROMETHAMINE 30 MILLIGRAM(S): 30 INJECTION, SOLUTION INTRAMUSCULAR; INTRAVENOUS at 01:35

## 2025-03-07 RX ADMIN — Medication 975 MILLIGRAM(S): at 21:29

## 2025-03-07 RX ADMIN — Medication 0.2 MILLIGRAM(S): at 00:39

## 2025-03-07 RX ADMIN — Medication 975 MILLIGRAM(S): at 03:41

## 2025-03-07 NOTE — DISCHARGE NOTE OB - MEDICATION SUMMARY - MEDICATIONS TO TAKE
I will START or STAY ON the medications listed below when I get home from the hospital:    ibuprofen 600 mg oral tablet  -- 1 tab(s) by mouth every 6 hours as needed for  moderate pain  -- Indication: For pain    acetaminophen 325 mg oral tablet  -- 3 tab(s) by mouth every 6 hours as needed for  moderate pain  -- Indication: For pain    escitalopram 20 mg oral tablet  -- 1 tab(s) by mouth once a day (at bedtime)  -- Indication: For home med

## 2025-03-07 NOTE — OB PROVIDER DELIVERY SUMMARY - NSSELHIDDEN_OBGYN_ALL_OB_FT
[NS_DeliveryAttending1_OBGYN_ALL_OB_FT:[id number]=],[NS_DeliveryAssist1_OBGYN_ALL_OB_FT:[id number]=],[NS_DeliveryRN_OBGYN_ALL_OB_FT:[id number]=],[NS_DeliveryAssist2_OBGYN_ALL_OB_FT:[id number]=]

## 2025-03-07 NOTE — OB PROVIDER LABOR PROGRESS NOTE - NS_SUBJECTIVE/OBJECTIVE_OBGYN_ALL_OB_FT
Vital Signs Last 24 Hrs  T(C): 36.5 (06 Mar 2025 17:03), Max: 36.8 (05 Mar 2025 23:02)  T(F): 97.7 (06 Mar 2025 17:03), Max: 98.24 (05 Mar 2025 23:02)  HR: 82 (06 Mar 2025 17:38) (60 - 109)  BP: 130/68 (06 Mar 2025 17:32) (100/56 - 136/85)  RR: 19 (06 Mar 2025 17:03) (17 - 20)  SpO2: 98% (06 Mar 2025 17:38) (86% - 99%)    Parameters below as of 06 Mar 2025 07:21  Patient On (Oxygen Delivery Method): room air
patient re-examined at bedside. Reports feeling contraction pain and back pain.
Patient re-exmained at bedside. Reports feeling rectal pressure.
PAtient examined s/p epidural
Patient examined by Dr. Marcano and removed cervidil

## 2025-03-07 NOTE — OB PROVIDER LABOR PROGRESS NOTE - NS_OBIHIFHRDETAILS_OBGYN_ALL_OB_FT
baseline FHR 120s, moderate variability, decel to 100s for 3-4 min
120bpm baseline, moderate variability,  + accelerations, no decelerations
baseline 125bpm, moderate variability, +accels, -decels
baseline FHR 120s, moderate variability, +accels, -decels
125bpm baseline, moderate variability, + accelerations, no decelerations

## 2025-03-07 NOTE — DISCHARGE NOTE OB - NS MD DC FALL RISK RISK
For information on Fall & Injury Prevention, visit: [email or web address] OR  [email or web address] OR  https://www.cdc.gov/steadi/patient.html

## 2025-03-07 NOTE — DISCHARGE NOTE OB - FINANCIAL ASSISTANCE
[facility] provides services at a reduced cost to those who are determined to be eligible through [facility]’s financial assistance program. Information regarding [facility]’s financial assistance program can be found by going to [email or web address] or by calling [phone].

## 2025-03-07 NOTE — DISCHARGE NOTE OB - CARE PROVIDER_API CALL
Jomar Marcano  Obstetrics and Gynecology  [address], [address]  [place] [address]  Phone: [phone]  Fax: [phone]  Follow Up Time: 1 month

## 2025-03-07 NOTE — OB PROVIDER LABOR PROGRESS NOTE - ASSESSMENT
VSS  Cat I tracing  Pit at 6mu  Plan for AROM next exam
- maternal VSS  - cat 1 tracing  - exam and AROM by Dr. Marcano  - continue pitocin
ESHA  Cat I tracing  Patient started on pitocin per Dr. Marcano
-VSS  -Continue with Pitocin  -T Cat I  -Will ask anesthesia for top off
-VSS  -Decel resolved after repositoining.  -Will continue to monitor

## 2025-03-07 NOTE — OB PROVIDER DELIVERY SUMMARY - NSPROVIDERDELIVERYNOTE_OBGYN_ALL_OB_FT
at 12:33AM of a live female and Apgars 9/9. Delivered CORAL, no nuchal cord, clear fluid. Infant's head delivered with maternal expulsive efforts. Shoulders delivered without difficulty followed by the rest of the body. Nose and mouth were bulb suctioned. Cord clamped and cut after delay. Samples obtained. Baby handed to patient. Placenta delivered spontaneously, intact, 3VC. Pitocin started. Uterine atony noted. Bimanual exam performed. Methergine and cytotec given. Fundus now firm with minimal bleeding. Perineum and vagina inspected – 1st degree perineal laceration repaired under local anesthesia with chromic suture. EBL 250cc. Hemostasis noted. Pt tolerated procedure well, in stable condition, recovering in LDR. Infant in LDR. Instrument/sponge count correct x 2 and confirmed by nurse.

## 2025-03-07 NOTE — DISCHARGE NOTE OB - PATIENT PORTAL LINK FT
You can access the FollowMyHealth Patient Portal offered by [facility] by registering at the following website: [email or web address]. By joining [employer]’s FollowMyHealth portal, you will also be able to view your health information using other applications (apps) compatible with our system.

## 2025-03-07 NOTE — DISCHARGE NOTE OB - CARE PLAN
Principal Discharge DX:	 (normal spontaneous vaginal delivery)  Assessment and plan of treatment:	1) Please take ibuprofen and/or Tylenol as needed for pain as prescribed.  2) Nothing in the vagina for 6 weeks (including no sex, no tampons, and no douching).  3) Please call your doctor for a follow up your postpartum appointment in 4-6 weeks.  4) Please continue taking vitamins postpartum.   5) Please call the office sooner if you have heavy vaginal bleeding, severe abdominal pain, or fever > 100.4F.  6) You may resume regular daily activity as tolerated   1

## 2025-03-08 VITALS
RESPIRATION RATE: 17 BRPM | TEMPERATURE: 97 F | DIASTOLIC BLOOD PRESSURE: 77 MMHG | HEART RATE: 62 BPM | OXYGEN SATURATION: 98 % | SYSTOLIC BLOOD PRESSURE: 115 MMHG

## 2025-03-08 PROCEDURE — 87389 HIV-1 AG W/HIV-1&-2 AB AG IA: CPT

## 2025-03-08 PROCEDURE — 85027 COMPLETE CBC AUTOMATED: CPT

## 2025-03-08 PROCEDURE — 85025 COMPLETE CBC W/AUTO DIFF WBC: CPT

## 2025-03-08 PROCEDURE — 86900 BLOOD TYPING SEROLOGIC ABO: CPT

## 2025-03-08 PROCEDURE — 88307 TISSUE EXAM BY PATHOLOGIST: CPT

## 2025-03-08 PROCEDURE — 36415 COLL VENOUS BLD VENIPUNCTURE: CPT

## 2025-03-08 PROCEDURE — 86703 HIV-1/HIV-2 1 RESULT ANTBDY: CPT

## 2025-03-08 PROCEDURE — 86850 RBC ANTIBODY SCREEN: CPT

## 2025-03-08 PROCEDURE — 59050 FETAL MONITOR W/REPORT: CPT

## 2025-03-08 PROCEDURE — 82962 GLUCOSE BLOOD TEST: CPT

## 2025-03-08 PROCEDURE — 86780 TREPONEMA PALLIDUM: CPT

## 2025-03-08 PROCEDURE — 86901 BLOOD TYPING SEROLOGIC RH(D): CPT

## 2025-03-08 RX ORDER — ACETAMINOPHEN 500 MG/5ML
3 LIQUID (ML) ORAL
Qty: 60 | Refills: 0
Start: 2025-03-08 | End: 2025-03-12

## 2025-03-08 RX ORDER — IBUPROFEN 200 MG
1 TABLET ORAL
Qty: 20 | Refills: 0
Start: 2025-03-08 | End: 2025-03-12

## 2025-03-08 RX ADMIN — Medication 600 MILLIGRAM(S): at 00:29

## 2025-03-08 RX ADMIN — Medication 975 MILLIGRAM(S): at 08:48

## 2025-03-08 RX ADMIN — Medication 600 MILLIGRAM(S): at 05:53

## 2025-03-08 RX ADMIN — Medication 600 MILLIGRAM(S): at 01:25

## 2025-03-08 NOTE — PROGRESS NOTE ADULT - SUBJECTIVE AND OBJECTIVE BOX
INTERVAL HPI/OVERNIGHT EVENTS:  24y Female s/p labor epidural on 3/6    Vital Signs Last 24 Hrs  T(C): 37.1 (07 Mar 2025 04:39), Max: 37.1 (07 Mar 2025 04:39)  T(F): 98.8 (07 Mar 2025 04:39), Max: 98.8 (07 Mar 2025 04:39)  HR: 89 (07 Mar 2025 04:39) (56 - 127)  BP: 110/69 (07 Mar 2025 04:39) (107/65 - 145/63)  BP(mean): 83 (07 Mar 2025 04:39) (83 - 99)  RR: 18 (07 Mar 2025 04:39) (16 - 20)  SpO2: 95% (07 Mar 2025 04:39) (86% - 99%)    Parameters below as of 07 Mar 2025 04:39  Patient On (Oxygen Delivery Method): room air            Patient's overall anesthesia satisfaction: Positive    Patient doing well     No headache      No residual numbness or weakness, sensory and motor function intact    No anesthetic complications or complaints noted or reported                 
CHEMA PITTMAN is a 24y  now PPD# 1 s/p  at 38w1d EGA for FGR. Uncomplicated.     Subjective:    No acute events overnight.  Patient has no complaints.  Pain is well controlled.  +flatus, + voiding.  Ambulating and tolerating PO.  Appropriate lochia.  Denies fever, chills, nausea, and vomiting.  She denies lightheadedness, dizziness, HA, blurry vision, palpitations, chest pain and SOB.     Objective:    T(C): 36.3 (25 @ 04:16), Max: 36.8 (25 @ 15:38)  HR: 62 (25 @ 04:16) (62 - 74)  BP: 115/77 (25 @ 04:16) (115/77 - 117/81)  RR: 17 (25 @ 04:16) (17 - 18)  SpO2: 98% (25 @ 04:16) (97% - 98%)    Physical exam:  General: AOx3, NAD.  Abdomen: Soft, appropriately tender to palpitation, fundus firm.  Vaginal: expectant lochia  Ext: No DVT signs, warm extremities.                          7.9    16.95 )-----------( 212      ( 07 Mar 2025 05:36 )             24.1

## 2025-03-08 NOTE — PROGRESS NOTE ADULT - ASSESSMENT
Assessment/Plan:  24y  now PPD# 1 s/p  at 38w1d EGA for FGR. Uncomplicated.     #Routine post partum care  - Stable, doing well postpartum  - Hgb 7.9 > am labs pending   - Pain: well controlled on PO pain meds  - GI: regular diet, normal bowel function  - : voiding, lochia decreasing  - DVT ppx: SCDs, ambulation encouraged  - Healthy baby F    Dispo: Patient to be discharged when meeting all postpartum milestones and pending attending approval.    Signed: Marissa Dotson MD ([email or web address]) Assessment/Plan:  24y  now PPD# 1 s/p  at 38w1d EGA for FGR. Uncomplicated.     #Routine post partum care  - Stable, doing well postpartum  - Hgb 7.9 > am labs pending   - Pain: well controlled on PO pain meds  - GI: regular diet, normal bowel function  - : voiding, lochia decreasing  - DVT ppx: SCDs, ambulation encouraged  - Healthy baby F    Dispo: Possible discharge home today, pending attending approval     Signed: Marissa Dotson MD ([email or web address])

## 2025-03-10 ENCOUNTER — APPOINTMENT (OUTPATIENT)
Dept: ANTEPARTUM | Facility: CLINIC | Age: 25
End: 2025-03-10

## 2025-03-10 DIAGNOSIS — O35.9XX0 MATERNAL CARE FOR (SUSPECTED) FETAL ABNORMALITY AND DAMAGE, UNSPECIFIED, NOT APPLICABLE OR UNSPECIFIED: ICD-10-CM

## 2025-03-10 DIAGNOSIS — O26.899 OTHER SPECIFIED PREGNANCY RELATED CONDITIONS, UNSPECIFIED TRIMESTER: ICD-10-CM

## 2025-03-10 DIAGNOSIS — Z3A.16 16 WEEKS GESTATION OF PREGNANCY: ICD-10-CM

## 2025-03-10 DIAGNOSIS — R82.998 OTHER ABNORMAL FINDINGS IN URINE: ICD-10-CM

## 2025-03-10 DIAGNOSIS — O21.9 VOMITING OF PREGNANCY, UNSPECIFIED: ICD-10-CM

## 2025-03-10 DIAGNOSIS — R51.9 OTHER SPECIFIED PREGNANCY RELATED CONDITIONS, UNSPECIFIED TRIMESTER: ICD-10-CM

## 2025-03-11 NOTE — ED STATDOCS - INTERNATIONAL TRAVEL
Head , normocephalic , atraumatic , Face , Face within normal limits , Ears , External ears within normal limits , Nose/Nasopharynx , External nose  normal appearance , Mouth and Throat , Oral cavity appearance normal
No

## 2025-03-12 LAB — SURGICAL PATHOLOGY STUDY: SIGNIFICANT CHANGE UP

## 2025-03-13 ENCOUNTER — APPOINTMENT (OUTPATIENT)
Dept: ANTEPARTUM | Facility: CLINIC | Age: 25
End: 2025-03-13

## 2025-03-17 ENCOUNTER — APPOINTMENT (OUTPATIENT)
Dept: ANTEPARTUM | Facility: CLINIC | Age: 25
End: 2025-03-17

## 2025-03-21 ENCOUNTER — APPOINTMENT (OUTPATIENT)
Dept: OBGYN | Facility: CLINIC | Age: 25
End: 2025-03-21
Payer: COMMERCIAL

## 2025-03-21 VITALS
BODY MASS INDEX: 25.61 KG/M2 | SYSTOLIC BLOOD PRESSURE: 115 MMHG | HEART RATE: 111 BPM | DIASTOLIC BLOOD PRESSURE: 76 MMHG | OXYGEN SATURATION: 98 % | HEIGHT: 64 IN | WEIGHT: 150 LBS

## 2025-03-21 DIAGNOSIS — G89.18 OTHER ACUTE POSTPROCEDURAL PAIN: ICD-10-CM

## 2025-03-21 PROCEDURE — 0503F POSTPARTUM CARE VISIT: CPT

## 2025-04-25 ENCOUNTER — APPOINTMENT (OUTPATIENT)
Dept: OBGYN | Facility: CLINIC | Age: 25
End: 2025-04-25
Payer: COMMERCIAL

## 2025-04-25 VITALS
HEART RATE: 76 BPM | DIASTOLIC BLOOD PRESSURE: 81 MMHG | SYSTOLIC BLOOD PRESSURE: 121 MMHG | HEIGHT: 64 IN | WEIGHT: 160 LBS | BODY MASS INDEX: 27.31 KG/M2

## 2025-04-25 DIAGNOSIS — G89.18 OTHER ACUTE POSTPROCEDURAL PAIN: ICD-10-CM

## 2025-04-25 PROCEDURE — 0503F POSTPARTUM CARE VISIT: CPT

## 2025-08-04 ENCOUNTER — APPOINTMENT (OUTPATIENT)
Dept: OBGYN | Facility: CLINIC | Age: 25
End: 2025-08-04

## 2025-09-08 ENCOUNTER — OUTPATIENT (OUTPATIENT)
Dept: EMERGENCY DEPT | Facility: HOSPITAL | Age: 25
LOS: 1 days | Discharge: HOME | End: 2025-09-08
Payer: COMMERCIAL

## 2025-09-08 ENCOUNTER — TRANSCRIPTION ENCOUNTER (OUTPATIENT)
Age: 25
End: 2025-09-08

## 2025-09-08 VITALS
RESPIRATION RATE: 18 BRPM | SYSTOLIC BLOOD PRESSURE: 130 MMHG | WEIGHT: 178.57 LBS | DIASTOLIC BLOOD PRESSURE: 72 MMHG | HEART RATE: 85 BPM | TEMPERATURE: 98 F | OXYGEN SATURATION: 100 %

## 2025-09-08 VITALS
DIASTOLIC BLOOD PRESSURE: 72 MMHG | SYSTOLIC BLOOD PRESSURE: 104 MMHG | OXYGEN SATURATION: 99 % | RESPIRATION RATE: 16 BRPM | HEART RATE: 50 BPM | TEMPERATURE: 97 F

## 2025-09-08 DIAGNOSIS — O00.102 LEFT TUBAL PREGNANCY WITHOUT INTRAUTERINE PREGNANCY: ICD-10-CM

## 2025-09-08 DIAGNOSIS — O00.90 UNSPECIFIED ECTOPIC PREGNANCY WITHOUT INTRAUTERINE PREGNANCY: ICD-10-CM

## 2025-09-08 DIAGNOSIS — N83.8 OTHER NONINFLAMMATORY DISORDERS OF OVARY, FALLOPIAN TUBE AND BROAD LIGAMENT: ICD-10-CM

## 2025-09-08 DIAGNOSIS — Z98.890 OTHER SPECIFIED POSTPROCEDURAL STATES: Chronic | ICD-10-CM

## 2025-09-08 DIAGNOSIS — O00.202 LEFT OVARIAN PREGNANCY WITHOUT INTRAUTERINE PREGNANCY: ICD-10-CM

## 2025-09-08 LAB
APTT BLD: 28.1 SEC — SIGNIFICANT CHANGE UP (ref 26.1–36.8)
BASOPHILS # BLD AUTO: 0.04 K/UL — SIGNIFICANT CHANGE UP (ref 0–0.2)
BASOPHILS NFR BLD AUTO: 0.5 % — SIGNIFICANT CHANGE UP (ref 0–2)
BLD GP AB SCN SERPL QL: SIGNIFICANT CHANGE UP
EOSINOPHIL # BLD AUTO: 0.31 K/UL — SIGNIFICANT CHANGE UP (ref 0–0.5)
EOSINOPHIL NFR BLD AUTO: 3.9 % — SIGNIFICANT CHANGE UP (ref 0–6)
HCT VFR BLD CALC: 31.8 % — LOW (ref 34.5–45)
HGB BLD-MCNC: 9.8 G/DL — LOW (ref 11.5–15.5)
IMM GRANULOCYTES # BLD AUTO: 0.03 K/UL — SIGNIFICANT CHANGE UP (ref 0–0.07)
IMM GRANULOCYTES NFR BLD AUTO: 0.4 % — SIGNIFICANT CHANGE UP (ref 0–0.9)
INR BLD: 0.91 RATIO — SIGNIFICANT CHANGE UP (ref 0.85–1.16)
LYMPHOCYTES # BLD AUTO: 2.21 K/UL — SIGNIFICANT CHANGE UP (ref 1–3.3)
LYMPHOCYTES NFR BLD AUTO: 27.5 % — SIGNIFICANT CHANGE UP (ref 13–44)
MANUAL SMEAR VERIFICATION: SIGNIFICANT CHANGE UP
MCHC RBC-ENTMCNC: 23.1 PG — LOW (ref 27–34)
MCHC RBC-ENTMCNC: 30.8 G/DL — LOW (ref 32–36)
MCV RBC AUTO: 74.8 FL — LOW (ref 80–100)
MICROCYTES BLD QL: ABNORMAL
MONOCYTES # BLD AUTO: 0.92 K/UL — HIGH (ref 0–0.9)
MONOCYTES NFR BLD AUTO: 11.4 % — SIGNIFICANT CHANGE UP (ref 2–14)
NEUTROPHILS # BLD AUTO: 4.53 K/UL — SIGNIFICANT CHANGE UP (ref 1.8–7.4)
NEUTROPHILS NFR BLD AUTO: 56.3 % — SIGNIFICANT CHANGE UP (ref 43–77)
NRBC # BLD AUTO: 0 K/UL — SIGNIFICANT CHANGE UP (ref 0–0)
NRBC # FLD: 0 K/UL — SIGNIFICANT CHANGE UP (ref 0–0)
NRBC BLD AUTO-RTO: 0 /100 WBCS — SIGNIFICANT CHANGE UP (ref 0–0)
PLAT MORPH BLD: NORMAL — SIGNIFICANT CHANGE UP
PLATELET # BLD AUTO: 324 K/UL — SIGNIFICANT CHANGE UP (ref 150–400)
PMV BLD: 9.9 FL — SIGNIFICANT CHANGE UP (ref 7–13)
POIKILOCYTOSIS BLD QL AUTO: SLIGHT — SIGNIFICANT CHANGE UP
PROTHROM AB SERPL-ACNC: 10.6 SEC — SIGNIFICANT CHANGE UP (ref 9.9–13.4)
RBC # BLD: 4.25 M/UL — SIGNIFICANT CHANGE UP (ref 3.8–5.2)
RBC # FLD: 18.1 % — HIGH (ref 10.3–14.5)
RBC BLD AUTO: ABNORMAL
WBC # BLD: 8.04 K/UL — SIGNIFICANT CHANGE UP (ref 3.8–10.5)
WBC # FLD AUTO: 8.04 K/UL — SIGNIFICANT CHANGE UP (ref 3.8–10.5)
WBC MORPHOLOGY: NORMAL — SIGNIFICANT CHANGE UP

## 2025-09-08 PROCEDURE — 88305 TISSUE EXAM BY PATHOLOGIST: CPT

## 2025-09-08 PROCEDURE — 93010 ELECTROCARDIOGRAM REPORT: CPT

## 2025-09-08 PROCEDURE — C9399: CPT

## 2025-09-08 PROCEDURE — 93005 ELECTROCARDIOGRAM TRACING: CPT

## 2025-09-08 PROCEDURE — 86923 COMPATIBILITY TEST ELECTRIC: CPT

## 2025-09-08 PROCEDURE — 88305 TISSUE EXAM BY PATHOLOGIST: CPT | Mod: 26

## 2025-09-08 PROCEDURE — 76817 TRANSVAGINAL US OBSTETRIC: CPT | Mod: 26

## 2025-09-08 RX ORDER — SODIUM CHLORIDE 9 G/1000ML
1000 INJECTION, SOLUTION INTRAVENOUS
Refills: 0 | Status: DISCONTINUED | OUTPATIENT
Start: 2025-09-08 | End: 2025-09-08

## 2025-09-08 RX ORDER — ONDANSETRON HCL/PF 4 MG/2 ML
4 VIAL (ML) INJECTION EVERY 6 HOURS
Refills: 0 | Status: DISCONTINUED | OUTPATIENT
Start: 2025-09-08 | End: 2025-09-08

## 2025-09-08 RX ORDER — FENTANYL CITRATE-0.9 % NACL/PF 100MCG/2ML
50 SYRINGE (ML) INTRAVENOUS
Refills: 0 | Status: DISCONTINUED | OUTPATIENT
Start: 2025-09-08 | End: 2025-09-08

## 2025-09-08 RX ORDER — HYDROMORPHONE/SOD CHLOR,ISO/PF 2 MG/10 ML
0.5 SYRINGE (ML) INJECTION
Refills: 0 | Status: DISCONTINUED | OUTPATIENT
Start: 2025-09-08 | End: 2025-09-08

## 2025-09-08 RX ORDER — OXYCODONE HYDROCHLORIDE 30 MG/1
5 TABLET ORAL ONCE
Refills: 0 | Status: DISCONTINUED | OUTPATIENT
Start: 2025-09-08 | End: 2025-09-08

## 2025-09-08 RX ADMIN — Medication 0.5 MILLIGRAM(S): at 15:21

## 2025-09-08 RX ADMIN — Medication 0.5 MILLIGRAM(S): at 15:15

## 2025-09-08 RX ADMIN — Medication 0.5 MILLIGRAM(S): at 15:45

## 2025-09-08 RX ADMIN — Medication 0.5 MILLIGRAM(S): at 14:57

## 2025-09-11 LAB — SURGICAL PATHOLOGY STUDY: SIGNIFICANT CHANGE UP

## 2025-09-15 ENCOUNTER — APPOINTMENT (OUTPATIENT)
Dept: OBGYN | Facility: CLINIC | Age: 25
End: 2025-09-15
Payer: COMMERCIAL

## 2025-09-15 VITALS
SYSTOLIC BLOOD PRESSURE: 120 MMHG | HEART RATE: 110 BPM | DIASTOLIC BLOOD PRESSURE: 72 MMHG | WEIGHT: 176 LBS | BODY MASS INDEX: 30.05 KG/M2 | HEIGHT: 64 IN

## 2025-09-15 DIAGNOSIS — Z98.890 OTHER SPECIFIED POSTPROCEDURAL STATES: ICD-10-CM

## 2025-09-15 DIAGNOSIS — N92.6 IRREGULAR MENSTRUATION, UNSPECIFIED: ICD-10-CM

## 2025-09-15 PROCEDURE — 99024 POSTOP FOLLOW-UP VISIT: CPT

## 2025-09-15 RX ORDER — NORETHINDRONE ACETATE AND ETHINYL ESTRADIOL AND FERROUS FUMARATE 1MG-20(21)
1-20 KIT ORAL DAILY
Qty: 3 | Refills: 1 | Status: ACTIVE | COMMUNITY
Start: 2025-09-15 | End: 1900-01-01

## 2025-09-16 LAB — HCG SERPL-MCNC: 208 MIU/ML
